# Patient Record
Sex: MALE | Race: WHITE | NOT HISPANIC OR LATINO | Employment: OTHER | URBAN - METROPOLITAN AREA
[De-identification: names, ages, dates, MRNs, and addresses within clinical notes are randomized per-mention and may not be internally consistent; named-entity substitution may affect disease eponyms.]

---

## 2017-05-23 ENCOUNTER — TRANSFERRED RECORDS (OUTPATIENT)
Dept: HEALTH INFORMATION MANAGEMENT | Facility: CLINIC | Age: 72
End: 2017-05-23

## 2018-03-20 ENCOUNTER — TRANSFERRED RECORDS (OUTPATIENT)
Dept: HEALTH INFORMATION MANAGEMENT | Facility: CLINIC | Age: 73
End: 2018-03-20

## 2019-03-13 ENCOUNTER — TRANSFERRED RECORDS (OUTPATIENT)
Dept: HEALTH INFORMATION MANAGEMENT | Facility: CLINIC | Age: 74
End: 2019-03-13

## 2019-04-04 ENCOUNTER — TRANSFERRED RECORDS (OUTPATIENT)
Dept: HEALTH INFORMATION MANAGEMENT | Facility: CLINIC | Age: 74
End: 2019-04-04

## 2019-04-04 LAB — TSH SERPL-ACNC: 1.3 UU/ML

## 2019-10-04 ENCOUNTER — HEALTH MAINTENANCE LETTER (OUTPATIENT)
Age: 74
End: 2019-10-04

## 2019-11-01 ENCOUNTER — HEALTH MAINTENANCE LETTER (OUTPATIENT)
Age: 74
End: 2019-11-01

## 2020-02-08 ENCOUNTER — HEALTH MAINTENANCE LETTER (OUTPATIENT)
Age: 75
End: 2020-02-08

## 2020-09-30 ENCOUNTER — TRANSFERRED RECORDS (OUTPATIENT)
Dept: HEALTH INFORMATION MANAGEMENT | Facility: CLINIC | Age: 75
End: 2020-09-30
Payer: COMMERCIAL

## 2020-10-01 ENCOUNTER — TRANSFERRED RECORDS (OUTPATIENT)
Dept: HEALTH INFORMATION MANAGEMENT | Facility: CLINIC | Age: 75
End: 2020-10-01
Payer: COMMERCIAL

## 2020-10-08 ENCOUNTER — TRANSFERRED RECORDS (OUTPATIENT)
Dept: HEALTH INFORMATION MANAGEMENT | Facility: CLINIC | Age: 75
End: 2020-10-08
Payer: COMMERCIAL

## 2020-11-08 ENCOUNTER — HEALTH MAINTENANCE LETTER (OUTPATIENT)
Age: 75
End: 2020-11-08

## 2021-03-28 ENCOUNTER — HEALTH MAINTENANCE LETTER (OUTPATIENT)
Age: 76
End: 2021-03-28

## 2021-09-11 ENCOUNTER — HEALTH MAINTENANCE LETTER (OUTPATIENT)
Age: 76
End: 2021-09-11

## 2021-10-08 ENCOUNTER — TRANSFERRED RECORDS (OUTPATIENT)
Dept: HEALTH INFORMATION MANAGEMENT | Facility: CLINIC | Age: 76
End: 2021-10-08
Payer: COMMERCIAL

## 2021-12-10 ASSESSMENT — ACTIVITIES OF DAILY LIVING (ADL): CURRENT_FUNCTION: NO ASSISTANCE NEEDED

## 2021-12-14 ENCOUNTER — OFFICE VISIT (OUTPATIENT)
Dept: FAMILY MEDICINE | Facility: CLINIC | Age: 76
End: 2021-12-14
Payer: COMMERCIAL

## 2021-12-14 VITALS
DIASTOLIC BLOOD PRESSURE: 70 MMHG | TEMPERATURE: 97.7 F | OXYGEN SATURATION: 98 % | RESPIRATION RATE: 16 BRPM | SYSTOLIC BLOOD PRESSURE: 142 MMHG | HEART RATE: 50 BPM | BODY MASS INDEX: 28.83 KG/M2 | HEIGHT: 67 IN | WEIGHT: 183.7 LBS

## 2021-12-14 DIAGNOSIS — Z87.19 HISTORY OF HEMORRHOIDS: ICD-10-CM

## 2021-12-14 DIAGNOSIS — N52.9 ERECTILE DYSFUNCTION, UNSPECIFIED ERECTILE DYSFUNCTION TYPE: ICD-10-CM

## 2021-12-14 DIAGNOSIS — I65.21 STENOSIS OF RIGHT CAROTID ARTERY: ICD-10-CM

## 2021-12-14 DIAGNOSIS — Z00.00 ENCOUNTER FOR MEDICARE ANNUAL WELLNESS EXAM: Primary | ICD-10-CM

## 2021-12-14 DIAGNOSIS — E78.5 HYPERLIPIDEMIA LDL GOAL <130: ICD-10-CM

## 2021-12-14 DIAGNOSIS — I10 ESSENTIAL HYPERTENSION: ICD-10-CM

## 2021-12-14 DIAGNOSIS — E03.9 HYPOTHYROIDISM, UNSPECIFIED TYPE: ICD-10-CM

## 2021-12-14 DIAGNOSIS — N13.8 HYPERTROPHY OF PROSTATE WITH URINARY OBSTRUCTION: ICD-10-CM

## 2021-12-14 DIAGNOSIS — K40.20 NON-RECURRENT BILATERAL INGUINAL HERNIA WITHOUT OBSTRUCTION OR GANGRENE: ICD-10-CM

## 2021-12-14 DIAGNOSIS — N40.1 HYPERTROPHY OF PROSTATE WITH URINARY OBSTRUCTION: ICD-10-CM

## 2021-12-14 PROBLEM — I77.74 VERTEBRAL ARTERY DISSECTION (H): Status: ACTIVE | Noted: 2021-12-14

## 2021-12-14 LAB
ALT SERPL W P-5'-P-CCNC: 25 U/L (ref 0–45)
ANION GAP SERPL CALCULATED.3IONS-SCNC: 12 MMOL/L (ref 5–18)
BUN SERPL-MCNC: 29 MG/DL (ref 8–28)
CALCIUM SERPL-MCNC: 9.1 MG/DL (ref 8.5–10.5)
CHLORIDE BLD-SCNC: 104 MMOL/L (ref 98–107)
CHOLEST SERPL-MCNC: 188 MG/DL
CO2 SERPL-SCNC: 23 MMOL/L (ref 22–31)
CREAT SERPL-MCNC: 1.16 MG/DL (ref 0.7–1.3)
FASTING STATUS PATIENT QL REPORTED: YES
GFR SERPL CREATININE-BSD FRML MDRD: 61 ML/MIN/1.73M2
GLUCOSE BLD-MCNC: 99 MG/DL (ref 70–125)
HDLC SERPL-MCNC: 68 MG/DL
LDLC SERPL CALC-MCNC: 105 MG/DL
POTASSIUM BLD-SCNC: 4.6 MMOL/L (ref 3.5–5)
SODIUM SERPL-SCNC: 139 MMOL/L (ref 136–145)
TRIGL SERPL-MCNC: 76 MG/DL
TSH SERPL DL<=0.005 MIU/L-ACNC: 5.79 UIU/ML (ref 0.3–5)

## 2021-12-14 PROCEDURE — 84460 ALANINE AMINO (ALT) (SGPT): CPT | Performed by: FAMILY MEDICINE

## 2021-12-14 PROCEDURE — 80048 BASIC METABOLIC PNL TOTAL CA: CPT | Performed by: FAMILY MEDICINE

## 2021-12-14 PROCEDURE — 84443 ASSAY THYROID STIM HORMONE: CPT | Performed by: FAMILY MEDICINE

## 2021-12-14 PROCEDURE — 90732 PPSV23 VACC 2 YRS+ SUBQ/IM: CPT | Performed by: FAMILY MEDICINE

## 2021-12-14 PROCEDURE — G0439 PPPS, SUBSEQ VISIT: HCPCS | Performed by: FAMILY MEDICINE

## 2021-12-14 PROCEDURE — 80061 LIPID PANEL: CPT | Performed by: FAMILY MEDICINE

## 2021-12-14 PROCEDURE — G0009 ADMIN PNEUMOCOCCAL VACCINE: HCPCS | Performed by: FAMILY MEDICINE

## 2021-12-14 PROCEDURE — 36415 COLL VENOUS BLD VENIPUNCTURE: CPT | Performed by: FAMILY MEDICINE

## 2021-12-14 PROCEDURE — 99214 OFFICE O/P EST MOD 30 MIN: CPT | Mod: 25 | Performed by: FAMILY MEDICINE

## 2021-12-14 RX ORDER — LEVOTHYROXINE SODIUM 175 UG/1
175 TABLET ORAL DAILY
Qty: 90 TABLET | Refills: 3 | Status: SHIPPED | OUTPATIENT
Start: 2021-12-14 | End: 2021-12-23

## 2021-12-14 RX ORDER — ASPIRIN 325 MG
325 TABLET ORAL DAILY
COMMUNITY
Start: 2021-12-14 | End: 2022-03-25

## 2021-12-14 RX ORDER — TADALAFIL 5 MG/1
10 TABLET ORAL DAILY PRN
Qty: 90 TABLET | Refills: 1 | Status: SHIPPED | OUTPATIENT
Start: 2021-12-14 | End: 2022-01-04

## 2021-12-14 RX ORDER — TERAZOSIN 5 MG/1
CAPSULE ORAL
COMMUNITY
Start: 2021-10-22 | End: 2022-05-04

## 2021-12-14 ASSESSMENT — ENCOUNTER SYMPTOMS
FEVER: 0
HEMATOCHEZIA: 0
ARTHRALGIAS: 0
NAUSEA: 0
CHILLS: 0
HEARTBURN: 0
WEAKNESS: 0
MYALGIAS: 0
ABDOMINAL PAIN: 0
SHORTNESS OF BREATH: 0
DIARRHEA: 0
DIZZINESS: 0
HEADACHES: 0
NERVOUS/ANXIOUS: 0
PALPITATIONS: 0
JOINT SWELLING: 0
SORE THROAT: 0
HEMATURIA: 0
PARESTHESIAS: 0
CONSTIPATION: 0
EYE PAIN: 0
FREQUENCY: 0
DYSURIA: 0
COUGH: 0

## 2021-12-14 ASSESSMENT — MIFFLIN-ST. JEOR: SCORE: 1521.89

## 2021-12-14 ASSESSMENT — ACTIVITIES OF DAILY LIVING (ADL): CURRENT_FUNCTION: NO ASSISTANCE NEEDED

## 2021-12-14 NOTE — ASSESSMENT & PLAN NOTE
Palpable defect in the inguinal crease.  Likely bilateral hernia.  No pain or discomfort.  Longstanding without change.  Consider surgical referral if he develops discomfort.  We discussed the potential for incarceration.

## 2021-12-14 NOTE — PATIENT INSTRUCTIONS
Patient Education   Personalized Prevention Plan  You are due for the preventive services outlined below.  Your care team is available to assist you in scheduling these services.  If you have already completed any of these items, please share that information with your care team to update in your medical record.  Health Maintenance Due   Topic Date Due     ANNUAL REVIEW OF HM ORDERS  Never done     Zoster (Shingles) Vaccine (1 of 2) Never done     Pneumococcal Vaccine (1 of 1 - PPSV23) Never done     Thyroid Function Lab  10/07/2014     FALL RISK ASSESSMENT  10/14/2015     Cholesterol Lab  10/01/2019     Discuss Advance Care Planning  10/14/2019     Flu Vaccine (1) Never done

## 2021-12-14 NOTE — ASSESSMENT & PLAN NOTE
Trial of tadalafil.  Discussed potential lightheadedness from the combination of Terazosin and tadalafil.  Both medications will be dosed low.
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
## 2021-12-14 NOTE — ASSESSMENT & PLAN NOTE
Annual exam.  We spent time reviewing the EHR and updating information.  Records have been requested from his previous clinic.  Up-to-date with COVID-19 vaccination.  Fasting lab work today.  Follow-up in 6-12 months.
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
## 2021-12-14 NOTE — ASSESSMENT & PLAN NOTE
Blood pressure reasonably well controlled today.  Most recent measurements at home clinic were reportedly within normal limits.  No change to plan.  We will check basic metabolic panel.  We will plan to assume prescribing for his lisinopril/hydrochlorothiazide combination pill.

## 2021-12-20 DIAGNOSIS — E03.9 HYPOTHYROIDISM, UNSPECIFIED TYPE: ICD-10-CM

## 2021-12-21 ENCOUNTER — MYC MEDICAL ADVICE (OUTPATIENT)
Dept: FAMILY MEDICINE | Facility: CLINIC | Age: 76
End: 2021-12-21
Payer: COMMERCIAL

## 2021-12-21 DIAGNOSIS — E03.9 HYPOTHYROIDISM, UNSPECIFIED TYPE: ICD-10-CM

## 2021-12-23 RX ORDER — LEVOTHYROXINE SODIUM 175 UG/1
175 TABLET ORAL DAILY
Qty: 90 TABLET | Refills: 3 | Status: SHIPPED | OUTPATIENT
Start: 2021-12-23 | End: 2022-12-20

## 2022-01-03 DIAGNOSIS — N52.9 ERECTILE DYSFUNCTION, UNSPECIFIED ERECTILE DYSFUNCTION TYPE: ICD-10-CM

## 2022-01-03 NOTE — TELEPHONE ENCOUNTER
Medication Request  Medication name: Tadalafil 5 mg  Requested Pharmacy: [id number]  When was patient last seen for this?:  12/14/2021  Patient offered appointment:  N/A pharmacy request  Okay to leave a detailed message: no

## 2022-01-04 RX ORDER — TADALAFIL 5 MG/1
10 TABLET ORAL DAILY PRN
Qty: 90 TABLET | Refills: 1 | Status: SHIPPED | OUTPATIENT
Start: 2022-01-04 | End: 2024-01-24

## 2022-01-10 ENCOUNTER — MYC MEDICAL ADVICE (OUTPATIENT)
Dept: FAMILY MEDICINE | Facility: CLINIC | Age: 77
End: 2022-01-10
Payer: COMMERCIAL

## 2022-01-10 DIAGNOSIS — I10 HYPERTENSION GOAL BP (BLOOD PRESSURE) < 140/90: ICD-10-CM

## 2022-01-10 RX ORDER — LISINOPRIL AND HYDROCHLOROTHIAZIDE 20; 25 MG/1; MG/1
1 TABLET ORAL DAILY
Qty: 90 TABLET | Refills: 3 | Status: SHIPPED | OUTPATIENT
Start: 2022-01-10 | End: 2022-01-19

## 2022-01-11 ENCOUNTER — TELEPHONE (OUTPATIENT)
Dept: FAMILY MEDICINE | Facility: CLINIC | Age: 77
End: 2022-01-11
Payer: COMMERCIAL

## 2022-01-11 NOTE — TELEPHONE ENCOUNTER
Prior Authorization Request  Who s requesting:  Pharmacy  Pharmacy Name and Location: [facility]  Medication Name: Tadalafil 5 mg  Insurance Plan: [employer]  Insurance Member ID Number:  [id number]  CoverMyMeds Key:   Informed patient that prior authorizations can take up to 10 business days for response:   NA  Okay to leave a detailed message: No     Route to pool:  S[facility]NCHEZ CHOI [facility]

## 2022-01-13 NOTE — TELEPHONE ENCOUNTER
Central Prior Authorization Team   Phone: [phone]    PA Initiation    Medication: Tadalafil 5MG tablets  Insurance Company: [employer] - Phone [phone] Fax [phone]  Pharmacy Filling the Rx: [id number] - [place], [place] - [address]  Filling Pharmacy Phone: [phone]  Filling Pharmacy Fax:    Start Date: 1/13/2022

## 2022-01-17 NOTE — TELEPHONE ENCOUNTER
PRIOR AUTHORIZATION DENIED    Medication: Tadalafil 5MG tablets    Denial Date: 1/17/2022    Denial Rational:          Appeal Information:  If provider would like to appeal please provide a letter of medical necessity and route back to PA team.

## 2022-01-18 NOTE — TELEPHONE ENCOUNTER
Left detailed message for patient to confirm Lisinopril/HCTZ dose 20/25 or 20/12.5 ?  Which dose has patient been taking?

## 2022-01-19 RX ORDER — LISINOPRIL AND HYDROCHLOROTHIAZIDE 12.5; 2 MG/1; MG/1
1 TABLET ORAL DAILY
Qty: 90 TABLET | Refills: 3 | Status: SHIPPED | OUTPATIENT
Start: 2022-01-19 | End: 2022-12-20

## 2022-03-25 ENCOUNTER — MYC MEDICAL ADVICE (OUTPATIENT)
Dept: FAMILY MEDICINE | Facility: CLINIC | Age: 77
End: 2022-03-25
Payer: COMMERCIAL

## 2022-03-25 DIAGNOSIS — I65.21 STENOSIS OF RIGHT CAROTID ARTERY: ICD-10-CM

## 2022-03-25 DIAGNOSIS — B00.9 HSV (HERPES SIMPLEX VIRUS) INFECTION: Primary | ICD-10-CM

## 2022-03-25 RX ORDER — ACYCLOVIR 50 MG/G
CREAM TOPICAL
Qty: 15 G | Refills: 3 | Status: SHIPPED | OUTPATIENT
Start: 2022-03-25

## 2022-03-25 RX ORDER — ASPIRIN 325 MG
325 TABLET ORAL DAILY
Qty: 90 TABLET | Refills: 3 | Status: SHIPPED | OUTPATIENT
Start: 2022-03-25 | End: 2022-12-20

## 2022-04-06 ENCOUNTER — TELEPHONE (OUTPATIENT)
Dept: FAMILY MEDICINE | Facility: CLINIC | Age: 77
End: 2022-04-06
Payer: COMMERCIAL

## 2022-04-06 NOTE — TELEPHONE ENCOUNTER
Reason for Call:  Patient is calling to request the RX for  acyclovir (ZOVIRAX) 5 % external cream     BE CHANGED TO OINTMENT, NOT CREAM    SEND TO PETE [facility]ITO,     Detailed comments: LAST SEEN 12/2021. RX SENT ON 3/25, would like it changed to OINTMENT for better coverage by insurance.    Phone Number Patient can be reached at: Home number on file [phone] (home)    Best Time: any    Can we leave a detailed message on this number? YES    Call taken on 4/6/2022 at 2:49 PM by Andree Baeza

## 2022-05-04 DIAGNOSIS — E78.5 HYPERLIPIDEMIA LDL GOAL <130: ICD-10-CM

## 2022-05-04 DIAGNOSIS — N40.1 HYPERTROPHY OF PROSTATE WITH URINARY OBSTRUCTION: Primary | ICD-10-CM

## 2022-05-04 DIAGNOSIS — N13.8 HYPERTROPHY OF PROSTATE WITH URINARY OBSTRUCTION: Primary | ICD-10-CM

## 2022-05-04 RX ORDER — LOVASTATIN 40 MG
40 TABLET ORAL AT BEDTIME
Qty: 90 TABLET | Refills: 3 | Status: SHIPPED | OUTPATIENT
Start: 2022-05-04 | End: 2022-12-20

## 2022-05-04 RX ORDER — TERAZOSIN 5 MG/1
5 CAPSULE ORAL AT BEDTIME
Qty: 90 CAPSULE | Refills: 2 | Status: SHIPPED | OUTPATIENT
Start: 2022-05-04 | End: 2023-01-12

## 2022-05-04 NOTE — TELEPHONE ENCOUNTER
Reason for Call: patient is calling for refills of    lovastatin (MEVACOR) 40 MG tablet     terazosin (HYTRIN) 5 MG capsule     SEND TO [facility]    Detailed comments: last seen 12/2021.    Phone Number Patient can be reached at: Home number on file [phone] (home)    Best Time: any    Can we leave a detailed message on this number? YES    Call taken on 5/4/2022 at 8:27 AM by Andree Baeza

## 2022-05-17 ENCOUNTER — VIRTUAL VISIT (OUTPATIENT)
Dept: URGENT CARE | Facility: CLINIC | Age: 77
End: 2022-05-17
Payer: COMMERCIAL

## 2022-05-17 ENCOUNTER — NURSE TRIAGE (OUTPATIENT)
Dept: NURSING | Facility: CLINIC | Age: 77
End: 2022-05-17

## 2022-05-17 ENCOUNTER — NURSE TRIAGE (OUTPATIENT)
Dept: NURSING | Facility: CLINIC | Age: 77
End: 2022-05-17
Payer: COMMERCIAL

## 2022-05-17 DIAGNOSIS — U07.1 COVID: Primary | ICD-10-CM

## 2022-05-17 PROCEDURE — 99207 PR NO BILLABLE SERVICE THIS VISIT: CPT | Performed by: EMERGENCY MEDICINE

## 2022-05-17 NOTE — TELEPHONE ENCOUNTER
Patient spoke with FNA this morning and was advised to schedule a virtual visit with a provider to discuss Covid treatment options. Patient states he did not receive any call despite message in [employer] from provider that attempts were made to reach him.     Patient calling back now to reschedule the appointment. Spoke with  who is able to reschedule a virtual visit for tomorrow.    Ifrah Alvarenga RN  05/17/22 2:05 PM  [facility] Nurse Advisor      Additional Information    [1] Follow-up call to recent contact AND [2] information only call, no triage required    Protocols used: INFORMATION ONLY CALL-A-

## 2022-05-17 NOTE — PROGRESS NOTES
Attempted to reach patient multiple times over 20 to 25-minute..  Left several messages but unable to connect with patient.  Final attempt found mailbox to be full.    RISHI Toure MD

## 2022-05-17 NOTE — TELEPHONE ENCOUNTER
Triage Call:     Cough, headache started Sunday, 5/15/2022  Tested positive yesterday, 5/16/2022 for COVID-19    97% on RA  97.9F  BP: 116/60  RR:18  Pulse: 53    Patient is taking:   Tylenol   Cough drops    Disposition: Virtual appt. Pt agreed with plan to do a virtual appt with a provider for consideration for Paxlovid. Pt was given the care advice per the protocol as well.     How can I protect others?    These guidelines are for isolating before returning to work, school or .       If you DO have symptoms:  o Stay home and away from others  - For at least 5 days after your symptoms started, AND   - You are fever free for 24 hours (with no medicine that reduces fever), AND  - Your other symptoms are better.  o Wear a mask for 10 full days any time you are around others.    If you DON'T have symptoms:  o Stay at home and away from others for at least 5 days after your positive test.  o Wear a mask for 10 full days any time you are around others.      Reason for Disposition    HIGH RISK for severe COVID complications (e.g., weak immune system, age > 64 years, obesity with BMI > 25, pregnant, chronic lung disease or other chronic medical condition) (Exception: Already seen by PCP and no new or worsening symptoms.)    Additional Information    Negative: SEVERE difficulty breathing (e.g., struggling for each breath, speaks in single words)    Negative: Difficult to awaken or acting confused (e.g., disoriented, slurred speech)    Negative: Bluish (or gray) lips or face now    Negative: Shock suspected (e.g., cold/pale/clammy skin, too weak to stand, low BP, rapid pulse)    Negative: Sounds like a life-threatening emergency to the triager    Negative: [1] Diagnosed or suspected COVID-19 AND [2] symptoms lasting 3 or more weeks    Negative: [1] COVID-19 exposure AND [2] no symptoms    Negative: COVID-19 vaccine reaction suspected (e.g., fever, headache, muscle aches) occurring 1 to 3 days after getting vaccine     Negative: COVID-19 vaccine, questions about    Negative: [1] Lives with someone known to have influenza (flu test positive) AND [2] flu-like symptoms (e.g., cough, runny nose, sore throat, SOB; with or without fever)    Negative: [1] Adult with possible COVID-19 symptoms AND [2] triager concerned about severity of symptoms or other causes    Negative: COVID-19 and breastfeeding, questions about    Negative: SEVERE or constant chest pain or pressure  (Exception: Mild central chest pain, present only when coughing.)    Negative: MODERATE difficulty breathing (e.g., speaks in phrases, SOB even at rest, pulse 100-120)    Negative: Headache and stiff neck (can't touch chin to chest)    Negative: Oxygen level (e.g., pulse oximetry) 90 percent or lower    Negative: Chest pain or pressure    Negative: Patient sounds very sick or weak to the triager    Negative: MILD difficulty breathing (e.g., minimal/no SOB at rest, SOB with walking, pulse <100)    Negative: Fever > 103 F (39.4 C)    Negative: [1] Fever > 101 F (38.3 C) AND [2] over 60 years of age    Negative: [1] Fever > 100.0 F (37.8 C) AND [2] bedridden (e.g., nursing home patient, CVA, chronic illness, recovering from surgery)    Protocols used: CORONAVIRUS (COVID-19) DIAGNOSED OR [id number] 1.18.2022    Karmen Faust RN  [facility] Nurse Advisor 10:58 AM 5/17/2022

## 2022-05-17 NOTE — PROGRESS NOTES
Taz is a 77 year old who is being evaluated via a billable telephone visit.      What phone number would you like to be contacted at? [phone]  How would you like to obtain your AVS? MyChart    Assessment & Plan       ICD-10-CM    1. Infection due to 2019 novel coronavirus  U07.1 nirmatrelvir and ritonavir (PAXLOVID) therapy pack     Patient prescribed PAXLOVID. It has been >12 hours since his last dose of lovastatin and terazosin. Patient instructed to hold these medications, along with Cialis, during PAXLOVID treatment. Ok to resume 12 hours after his final dose of PAXLOVID. Continue supportive cares. Get plenty of rest and fluids.    Return in about 1 week (around 5/25/2022), or if symptoms worsen or fail to improve.    NORMA Rivera [facility]   Taz is a 77 year old who presents for the following health issues     HPI       COVID-19 Symptom Review  How many days ago did these symptoms start? 5/15/2022, lethargic. Tested positive on 5/16    Are any of the following symptoms significant for you?    New or worsening difficulty breathing? No    Worsening cough? No    Fever or chills? No    Headache: no    Sore throat: no    Chest pain: no    Diarrhea: no    Body aches? no    What treatments has patient tried? Tyl   Does patient live in a nursing home, group home, or shelter? no  Does patient have a way to get food/medications during quarantined? Yes, I have a friend or family member who can help me.      Review of Systems   Constitutional, HEENT, cardiovascular, pulmonary, GI, , musculoskeletal, neuro, skin, endocrine and psych systems are negative, except as otherwise noted.      Objective           Vitals:  No vitals were obtained today due to virtual visit.    Physical Exam   alert and no distress  PSYCH: Alert and oriented times 3; coherent speech, normal   rate and volume, able to articulate logical thoughts, able   to abstract reason, no  tangential thoughts, no hallucinations   or delusions  His affect is normal  RESP: No cough, no audible wheezing, able to talk in full sentences  Remainder of exam unable to be completed due to telephone visits                Phone call duration: 9 minutes

## 2022-05-18 ENCOUNTER — VIRTUAL VISIT (OUTPATIENT)
Dept: FAMILY MEDICINE | Facility: CLINIC | Age: 77
End: 2022-05-18
Payer: COMMERCIAL

## 2022-05-18 DIAGNOSIS — U07.1 INFECTION DUE TO 2019 NOVEL CORONAVIRUS: Primary | ICD-10-CM

## 2022-05-18 PROCEDURE — 99213 OFFICE O/P EST LOW 20 MIN: CPT | Mod: TEL | Performed by: PHYSICIAN ASSISTANT

## 2022-05-18 NOTE — PATIENT INSTRUCTIONS
"While on PAXLOVID, do not take lovastatin, terazosin, or tadalafil as these can interact with the medication. You are ok to resume these medications 12 hours AFTER your final dose of PAXLOVID.           Instructions for Patients  Your COVID-19 test was positive. This means you have the virus. Please follow the \"How can I take care of myself\" and \"How do I self-isolate?\" guidelines in these instructions.    What treatments are available?  Over-the-counter medicines may help with your symptoms such as runny or stuffy nose, cough, chills, and fever. Talk to your care team about your options.     Some people are at high risk for severe illness (for example if you have a weak immune system, you're 65 or older, or you have certain medical problems). If your risk it high and your symptoms started in the last 5 to 7 days, we strongly recommend for you to get COVID treatment as soon as possible before you get really sick. Paxlovid, Molnupiravir and the monoclonal antibody treatments are proven safe and effective, make you feel better faster, and prevent hospitalization and death.       You can schedule an appointment to discuss COVID treatment by requesting an appointment on [facility] by selecting \"schedule COVID-19 Treatment\" or by calling [email or web address] ([phone]) and pressing 7.    What are the symptoms of COVID-19?  Symptoms can include fever, cough, shortness of breath, chills, headache, muscle pain sore throat, fatigue, runny or stuffy nose, and loss of taste and smell. Other less common symptoms include nausea, vomiting, or diarrhea (watery stools).    Know when to call 911. Emergency warning signs include:  Trouble breathing or shortness of breath  Pain or pressure in the chest that doesn't go away  Feeling confused like you haven't felt before, or not being able to wake up  Bluish-colored lips or face    How can I take care of myself?  Get lots of rest. Drink extra fluids (unless a doctor has told you not " to).  Take Tylenol (acetaminophen) for fever or pain. If you have liver or kidney problems, ask your family doctor if it's okay to take Tylenol   Adults:   650 mg (two 325 mg pills or tablets) every 4 to 6 hours, or...   1,000 mg (two 500 mg pills or tablets) every 8 hours as needed.  Note: Don't take more than 3,000 mg in one day. Acetaminophen is found in many medicines (both prescribed and over the counter). Read all labels to be sure you don't take too much.  For children, check the Tylenol bottle for the right dose. The dose is based on the child's age or weight.  Take over the counter medicines for your symptoms as needed. Talk to your pharmacist.  If you have other health problems (like cancer, heart failure, an organ transplant, or severe kidney disease): Call your specialty clinic if you don't feel better in the next 2 days.    These guidelines are for isolating and quarantining before returning to work, school or .   For employers, schools and day cares: This is an official notice for this person s medical guidelines for returning in-person.   For health care sites: The CDC gives different isolation and quarantine guidelines for healthcare sites, please check with these sites before arriving.     How do I self-isolate?  You isolate when you have symptoms of COVID or a test shows you have COVID, even if you don t have symptoms.   If you DO have symptoms:  Stay home and away from others  For at least 5 days after your symptoms started, AND   You are fever free for 24 hours (with no medicine that reduces fever), AND  Your other symptoms are better.  Wear a mask for 10 full days any time you are around others.  If you DON T have symptoms:  Stay at home and away from others for at least 5 days after your positive test.  Wear a mask for 10 full days any time you are around others.    How and when do I quarantine?  You quarantine when you may have been exposed to the virus and DON T have symptoms.   Stay  home and away from others.   You must quarantine for 5 days after your last contact with a person who has COVID.  Note: If you are fully vaccinated, you don t need to quarantine. You should still follow the steps below.   Wear a mask for 10 full days any time you re around others.  Get tested at least 5 days after you were exposed, even if you don t have symptoms.   If you start to have symptoms, isolate right away and get tested.    Where can I get more information?  [facility] COVID-19 Resource Hub: [email or web address]   CDC Quarantine & Isolation: https://www.cdc.gov/coronavirus/2019-ncov/your-health/quarantine-isolation.html   CDC - What to Do If You're Sick: https://www.cdc.gov/coronavirus/2019-ncov/if-you-are-sick/index.html  [facility] clinical trials (COVID-19 research studies): [email or web address].[email or web address]/umn-clinical-trials  [place] Department of Health COVID-19 Public Hotline: 1-365.254.6989

## 2022-10-30 ENCOUNTER — HEALTH MAINTENANCE LETTER (OUTPATIENT)
Age: 77
End: 2022-10-30

## 2022-12-19 ASSESSMENT — ENCOUNTER SYMPTOMS
FREQUENCY: 1
WEAKNESS: 0
CHILLS: 0
CONSTIPATION: 0
HEMATOCHEZIA: 0
PALPITATIONS: 0
FEVER: 0
NAUSEA: 0
COUGH: 0
ARTHRALGIAS: 1
DIARRHEA: 0
HEADACHES: 0
MYALGIAS: 0
EYE PAIN: 0
SORE THROAT: 0
SHORTNESS OF BREATH: 0
HEARTBURN: 0
DIZZINESS: 0
PARESTHESIAS: 0
ABDOMINAL PAIN: 0
HEMATURIA: 0
JOINT SWELLING: 0
NERVOUS/ANXIOUS: 0
DYSURIA: 0

## 2022-12-19 ASSESSMENT — ACTIVITIES OF DAILY LIVING (ADL): CURRENT_FUNCTION: NO ASSISTANCE NEEDED

## 2022-12-20 ENCOUNTER — OFFICE VISIT (OUTPATIENT)
Dept: FAMILY MEDICINE | Facility: CLINIC | Age: 77
End: 2022-12-20
Payer: COMMERCIAL

## 2022-12-20 ENCOUNTER — MYC MEDICAL ADVICE (OUTPATIENT)
Dept: FAMILY MEDICINE | Facility: CLINIC | Age: 77
End: 2022-12-20

## 2022-12-20 VITALS
WEIGHT: 177.5 LBS | TEMPERATURE: 97.6 F | BODY MASS INDEX: 27.86 KG/M2 | DIASTOLIC BLOOD PRESSURE: 78 MMHG | RESPIRATION RATE: 16 BRPM | OXYGEN SATURATION: 98 % | SYSTOLIC BLOOD PRESSURE: 182 MMHG | HEIGHT: 67 IN | HEART RATE: 49 BPM

## 2022-12-20 DIAGNOSIS — N13.8 HYPERTROPHY OF PROSTATE WITH URINARY OBSTRUCTION: ICD-10-CM

## 2022-12-20 DIAGNOSIS — M25.511 CHRONIC RIGHT SHOULDER PAIN: ICD-10-CM

## 2022-12-20 DIAGNOSIS — N40.1 HYPERTROPHY OF PROSTATE WITH URINARY OBSTRUCTION: ICD-10-CM

## 2022-12-20 DIAGNOSIS — I10 ESSENTIAL HYPERTENSION: ICD-10-CM

## 2022-12-20 DIAGNOSIS — M19.011 PRIMARY OSTEOARTHRITIS OF RIGHT SHOULDER: ICD-10-CM

## 2022-12-20 DIAGNOSIS — G89.29 CHRONIC RIGHT SHOULDER PAIN: ICD-10-CM

## 2022-12-20 DIAGNOSIS — R39.9 LOWER URINARY TRACT SYMPTOMS (LUTS): ICD-10-CM

## 2022-12-20 DIAGNOSIS — Z00.00 ENCOUNTER FOR MEDICARE ANNUAL WELLNESS EXAM: Primary | ICD-10-CM

## 2022-12-20 DIAGNOSIS — I65.21 STENOSIS OF RIGHT CAROTID ARTERY: ICD-10-CM

## 2022-12-20 DIAGNOSIS — Z12.5 SCREENING FOR PROSTATE CANCER: ICD-10-CM

## 2022-12-20 DIAGNOSIS — E78.5 HYPERLIPIDEMIA LDL GOAL <130: ICD-10-CM

## 2022-12-20 DIAGNOSIS — E03.9 HYPOTHYROIDISM, UNSPECIFIED TYPE: ICD-10-CM

## 2022-12-20 PROBLEM — K40.20 NON-RECURRENT BILATERAL INGUINAL HERNIA WITHOUT OBSTRUCTION OR GANGRENE: Status: RESOLVED | Noted: 2021-12-14 | Resolved: 2022-12-20

## 2022-12-20 PROCEDURE — 80048 BASIC METABOLIC PNL TOTAL CA: CPT | Performed by: FAMILY MEDICINE

## 2022-12-20 PROCEDURE — G0439 PPPS, SUBSEQ VISIT: HCPCS | Performed by: FAMILY MEDICINE

## 2022-12-20 PROCEDURE — 36415 COLL VENOUS BLD VENIPUNCTURE: CPT | Performed by: FAMILY MEDICINE

## 2022-12-20 PROCEDURE — 99214 OFFICE O/P EST MOD 30 MIN: CPT | Mod: 25 | Performed by: FAMILY MEDICINE

## 2022-12-20 PROCEDURE — G0009 ADMIN PNEUMOCOCCAL VACCINE: HCPCS | Performed by: FAMILY MEDICINE

## 2022-12-20 PROCEDURE — 84443 ASSAY THYROID STIM HORMONE: CPT | Performed by: FAMILY MEDICINE

## 2022-12-20 PROCEDURE — 80061 LIPID PANEL: CPT | Performed by: FAMILY MEDICINE

## 2022-12-20 PROCEDURE — G0103 PSA SCREENING: HCPCS | Performed by: FAMILY MEDICINE

## 2022-12-20 PROCEDURE — 90670 PCV13 VACCINE IM: CPT | Performed by: FAMILY MEDICINE

## 2022-12-20 RX ORDER — LOVASTATIN 40 MG
40 TABLET ORAL AT BEDTIME
Qty: 90 TABLET | Refills: 3 | Status: SHIPPED | OUTPATIENT
Start: 2022-12-20 | End: 2024-01-15

## 2022-12-20 RX ORDER — LEVOTHYROXINE SODIUM 175 UG/1
175 TABLET ORAL DAILY
Qty: 90 TABLET | Refills: 3 | Status: SHIPPED | OUTPATIENT
Start: 2022-12-20 | End: 2024-01-18

## 2022-12-20 RX ORDER — LISINOPRIL AND HYDROCHLOROTHIAZIDE 12.5; 2 MG/1; MG/1
1 TABLET ORAL DAILY
Qty: 90 TABLET | Refills: 3 | Status: SHIPPED | OUTPATIENT
Start: 2022-12-20 | End: 2023-10-16

## 2022-12-20 RX ORDER — ASPIRIN 325 MG
325 TABLET ORAL DAILY
Qty: 90 TABLET | Refills: 3 | Status: SHIPPED | OUTPATIENT
Start: 2022-12-20

## 2022-12-20 ASSESSMENT — ENCOUNTER SYMPTOMS
NAUSEA: 0
DYSURIA: 0
CONSTIPATION: 0
WEAKNESS: 0
HEMATURIA: 0
HEADACHES: 0
HEARTBURN: 0
EYE PAIN: 0
MYALGIAS: 0
ABDOMINAL PAIN: 0
DIZZINESS: 0
NERVOUS/ANXIOUS: 0
JOINT SWELLING: 0
ARTHRALGIAS: 1
DIARRHEA: 0
FREQUENCY: 1
FEVER: 0
PALPITATIONS: 0
CHILLS: 0
SHORTNESS OF BREATH: 0
SORE THROAT: 0
PARESTHESIAS: 0
HEMATOCHEZIA: 0
COUGH: 0

## 2022-12-20 ASSESSMENT — ACTIVITIES OF DAILY LIVING (ADL): CURRENT_FUNCTION: NO ASSISTANCE NEEDED

## 2022-12-20 NOTE — ASSESSMENT & PLAN NOTE
The patient is looking for follow-up on this.  Previous measurements have shown less than 50% stenosis.  No symptoms suggestive of presyncope/syncope related to carotid artery disease.  Continue statin.  Continue aspirin.  Evaluate with ultrasound.

## 2022-12-20 NOTE — PROGRESS NOTES
"SUBJECTIVE:   Taz is a 77 year old who presents for Preventive Visit.    Chief Complaint   Patient presents with     Wellness Visit     Fasting     Right shoulder pain:   - onset with pickle ball.   - took a break.   - some limitation of movement  \"mild soreness.\"     LUTS:   - drinks coffee throughout the day.  Worse with alcohol.  More nocturia.  Sense of incomplete voiding.  Decreased urinary flow.  Takes terazosin.  No obvious side effects from terazosin.    Patient has been advised of split billing requirements and indicates understanding: Yes  Are you in the first 12 months of your Medicare coverage?  No    Healthy Habits:     In general, how would you rate your overall health?  Excellent    Frequency of exercise:  6-7 days/week    Duration of exercise:  Greater than 60 minutes    Do you usually eat at least 4 servings of fruit and vegetables a day, include whole grains    & fiber and avoid regularly eating high fat or \"junk\" foods?  No    Taking medications regularly:  Yes    Medication side effects:  Not applicable    Ability to successfully perform activities of daily living:  No assistance needed    Home Safety:  Lack of grab bars in the bathroom    Hearing Impairment:  Difficulty following a conversation in a noisy restaurant or crowded room and difficulty understanding soft or whispered speech    In the past 6 months, have you been bothered by leaking of urine?  No    In general, how would you rate your overall mental or emotional health?  Excellent      PHQ-2 Total Score: 0    Additional concerns today:  Yes      Have you ever done Advance Care Planning? (For example, a Health Directive, POLST, or a discussion with a medical provider or your loved ones about your wishes): Yes, patient states has an Advance Care Planning document and will bring a copy to the clinic.       Fall risk  Fallen 2 or more times in the past year?: No  Any fall with injury in the past year?: No    Cognitive Screening   1) " Repeat 3 items (Leader, Season, Table) Normal  2) Clock draw: NORMAL  3) 3 item recall: Recalls 2 objects   Results: NORMAL clock, 1-2 items recalled: COGNITIVE IMPAIRMENT LESS LIKELY    Mini-CogTM Copyright S Ariane. Licensed by the author for use in [facility]; reprinted with permission ([email or web address]). All rights reserved.          Reviewed and updated as needed this visit by clinical staff   Tobacco  Allergies  Meds  Problems             Reviewed and updated as needed this visit by Provider      Problems            Social History     Tobacco Use     Smoking status: Former     Packs/day: 0.00     Years: 10.00     Pack years: 0.00     Types: Cigarettes     Start date: 1958     Quit date: 1968     Years since quittin.6     Smokeless tobacco: Never   Substance Use Topics     Alcohol use: Yes     Comment: Casual     If you drink alcohol do you typically have >3 drinks per day or >7 drinks per week? No    Alcohol Use 2022   Prescreen: >3 drinks/day or >7 drinks/week? -   Prescreen: >3 drinks/day or >7 drinks/week? No       Current providers sharing in care for this patient include:   Patient Care Team:  Tre Mckeon MD as PCP - General (Family Medicine)  Tre Mckeon MD as Assigned PCP    The following health maintenance items are reviewed in Epic and correct as of today:  Health Maintenance   Topic Date Due     COVID-19 Vaccine (4 - Booster for Moderna series) 2021     TSH W/FREE T4 REFLEX  2022     ZOSTER IMMUNIZATION (1 of 2) 2023 (Originally 1995)     INFLUENZA VACCINE (1) 2023 (Originally 2022)     MEDICARE ANNUAL WELLNESS VISIT  2023     ANNUAL REVIEW OF HM ORDERS  2023     FALL RISK ASSESSMENT  2023     LIPID  [id number]     ADVANCE CARE PLANNING  2027     DTAP/TDAP/TD IMMUNIZATION (2 - Td or Tdap) 2028     HEPATITIS C SCREENING  Completed     PHQ-2 (once per calendar year)  Completed     Pneumococcal  "Vaccine: 65+ Years  Completed     IPV IMMUNIZATION  Aged Out     MENINGITIS IMMUNIZATION  Aged Out     COLORECTAL CANCER SCREENING  Discontinued     Pneumonia Vaccine:For adults 65 years or older who do not have an immunocompromising condition, cerebrospinal fluid leak, or cochlear implant and want to receive PCV13 AND PPSV23: Administer 1 dose of PCV13 first then give 1 dose of PPSV23 at least 1 year later. If the patient already received PPSV23, give the dose of PCV13 at least 1 year after they received the most recent dose of PPSV23. Anyone who received any doses of PPSV23 before age 65 should receive 1 final dose of the vaccine at age 65 or older. Administer this last dose at least 5 years after the prior PPSV23 dose.        Review of Systems   Constitutional: Negative for chills and fever.   HENT: Positive for hearing loss. Negative for congestion, ear pain and sore throat.    Eyes: Negative for pain and visual disturbance.   Respiratory: Negative for cough and shortness of breath.    Cardiovascular: Negative for chest pain, palpitations and peripheral edema.   Gastrointestinal: Negative for abdominal pain, constipation, diarrhea, heartburn, hematochezia and nausea.   Genitourinary: Positive for frequency, impotence and urgency. Negative for dysuria, genital sores, hematuria and penile discharge.   Musculoskeletal: Positive for arthralgias. Negative for joint swelling and myalgias.   Skin: Negative for rash.   Neurological: Negative for dizziness, weakness, headaches and paresthesias.   Psychiatric/Behavioral: Negative for mood changes. The patient is not nervous/anxious.        OBJECTIVE:   BP (!) 182/78 (BP Location: Left arm, Patient Position: Sitting, Cuff Size: Adult Regular)   Pulse (!) 49   Temp 97.6  F (36.4  C) (Oral)   Resp 16   Ht 1.702 m (5' 7\")   Wt 80.5 kg (177 lb 8 oz)   SpO2 98%   BMI 27.80 kg/m   Estimated body mass index is 27.8 kg/m  as calculated from the following:    Height as of " "this encounter: 1.702 m (5' 7\").    Weight as of this encounter: 80.5 kg (177 lb 8 oz).  Physical Exam  Vitals reviewed.   Constitutional:       General: He is not in acute distress.     Appearance: Normal appearance.   HENT:      Head: Normocephalic and atraumatic.      Right Ear: External ear normal.      Left Ear: External ear normal.      Nose: Nose normal.      Mouth/Throat:      Pharynx: No oropharyngeal exudate or posterior oropharyngeal erythema.   Eyes:      General: No scleral icterus.  Cardiovascular:      Rate and Rhythm: Normal rate and regular rhythm.      Heart sounds: Normal heart sounds. No murmur heard.    No friction rub. No gallop.   Pulmonary:      Effort: Pulmonary effort is normal. No respiratory distress.      Breath sounds: No wheezing.   Abdominal:      General: Bowel sounds are normal. There is no distension.      Palpations: Abdomen is soft. There is no mass.      Tenderness: There is no abdominal tenderness.   Musculoskeletal:         General: No swelling. Normal range of motion.      Cervical back: Normal range of motion.      Comments: Negative Neer's.  Mildly positive Richter.  Active range of motion is intact and is normal.  No pain with internal or external resisted range of motion.  Negative empty can testing.  Nontender to palpation.  Bulk/symmetry intact.   Skin:     Findings: No rash.   Neurological:      General: No focal deficit present.      Mental Status: He is alert and oriented to person, place, and time.      Cranial Nerves: No cranial nerve deficit.      Deep Tendon Reflexes: Reflexes normal.   Psychiatric:         Mood and Affect: Mood normal.         Behavior: Behavior normal.         Thought Content: Thought content normal.         Judgment: Judgment normal.         Diagnostic Test Results:  Labs reviewed in Epic    ASSESSMENT / PLAN:     Problem List Items Addressed This Visit     RESOLVED: Chronic right shoulder pain    Relevant Orders    Physical Therapy Referral "    DJD of shoulder     The patient has had some ongoing pain of the right shoulder.  History of what sounds like osteoarthritis which was amenable to steroid injection.  Imaging not completed today.  Physical therapy suggested although the patient is undecided.         Relevant Medications    aspirin (ASA) 325 MG tablet    Encounter for Medicare annual wellness exam - Primary    Essential hypertension     This patient's blood pressure is unexpectedly elevated today.  It is checked from time to time outside of clinic and is generally been within normal limits.  I have asked him to check it every day for the next couple of weeks and report back.  If it remains elevated or if there is ambiguity, I will have him return to clinic for recheck with nursing staff.  Plan to increase lisinopril-hydrochlorothiazide combo pill if it remains elevated to the same degree that it was today.         Relevant Medications    lisinopril-hydrochlorothiazide (ZESTORETIC) 20-12.5 MG tablet    Other Relevant Orders    Basic metabolic panel  (Ca, Cl, CO2, Creat, Gluc, K, Na, BUN)    Hyperlipidemia LDL goal <130     Check lipids.  Some evidence of carotid artery stenosis on past imaging.  Continue lovastatin.  Continue aspirin.         Relevant Medications    lovastatin (MEVACOR) 40 MG tablet    Other Relevant Orders    Lipid panel reflex to direct LDL Fasting    Hypertrophy of prostate with urinary obstruction     The patient describes worsening symptoms of obstruction.  He has had some screening for prostate cancer in the past with PSA testing although its unclear when the last time it was checked.  He does consume caffeine and alcohol which may be contributory.  Unclear clinical benefit of terazosin.  - Given worsening, I did recommend a PSA test.  We discussed the potential for false positives.  - Consider trial of tamsulosin.         Hypothyroidism     History of hypothyroidism.  Asymptomatic?  Blood pressure elevated.  Check TSH  "today.         Relevant Medications    levothyroxine (SYNTHROID/LEVOTHROID) 175 MCG tablet    Other Relevant Orders    TSH WITH FREE T4 REFLEX    Stenosis of right carotid artery     The patient is looking for follow-up on this.  Previous measurements have shown less than 50% stenosis.  No symptoms suggestive of presyncope/syncope related to carotid artery disease.  Continue statin.  Continue aspirin.  Evaluate with ultrasound.         Relevant Medications    aspirin (ASA) 325 MG tablet    Other Relevant Orders    Lipid panel reflex to direct LDL Fasting    US Carotid Bilateral   Other Visit Diagnoses     Lower urinary tract symptoms (LUTS)        Relevant Orders    PSA, screen    Screening for prostate cancer        Relevant Orders    PSA, screen          Patient has been advised of split billing requirements and indicates understanding: Yes      COUNSELING:  Reviewed preventive health counseling, as reflected in patient instructions       Regular exercise       Healthy diet/nutrition      BMI:   Estimated body mass index is 27.8 kg/m  as calculated from the following:    Height as of this encounter: 1.702 m (5' 7\").    Weight as of this encounter: 80.5 kg (177 lb 8 oz).   Weight management plan: Discussed healthy diet and exercise guidelines      He reports that he quit smoking about 54 years ago. His smoking use included cigarettes. He started smoking about 64 years ago. He has never used smokeless tobacco.    Appropriate preventive services were discussed with this patient, including applicable screening as appropriate for cardiovascular disease, diabetes, osteopenia/osteoporosis, and glaucoma.  As appropriate for age/gender, discussed screening for colorectal cancer, prostate cancer, breast cancer, and cervical cancer. Checklist reviewing preventive services available has been given to the patient.    Reviewed patients plan of care and provided an AVS. The Basic Care Plan (routine screening as documented in " Health Maintenance) for Taz meets the Care Plan requirement. This Care Plan has been established and reviewed with the Patient.    Tre Mckeon MD  [facility]    Identified Health Risks:    The patient was counseled and encouraged to consider modifying their diet and eating habits. He was provided with information on recommended healthy diet options.  The patient was provided with written information regarding signs of hearing loss.

## 2022-12-20 NOTE — ASSESSMENT & PLAN NOTE
The patient has had some ongoing pain of the right shoulder.  History of what sounds like osteoarthritis which was amenable to steroid injection.  Imaging not completed today.  Physical therapy suggested although the patient is undecided.

## 2022-12-20 NOTE — PATIENT INSTRUCTIONS
Patient Education   Personalized Prevention Plan  You are due for the preventive services outlined below.  Your care team is available to assist you in scheduling these services.  If you have already completed any of these items, please share that information with your care team to update in your medical record.  Health Maintenance Due   Topic Date Due     COVID-19 Vaccine (4 - Booster for Moderna series) 12/26/2021     Thyroid Function Lab  12/14/2022     Pneumococcal Vaccine (2 - PCV) 12/14/2022       Understanding USDA MyPlate  The USDA has guidelines to help you make healthy food choices. These are called MyPlate. MyPlate shows the food groups that make up healthy meals using the image of a place setting. Before you eat, think about the healthiest choices for what to put on your plate or in your cup or bowl. To learn more about building a healthy plate, visit www.choosemyplate.gov.    The food groups    Fruits. Any fruit or 100% fruit juice counts as part of the Fruit Group. Fruits may be fresh, canned, frozen, or dried, and may be whole, cut-up, or pureed. Make 1/2 of your plate fruits and vegetables.    Vegetables. Any vegetable or 100% vegetable juice counts as a member of the Vegetable Group. Vegetables may be fresh, frozen, canned, or dried. They can be served raw or cooked and may be whole, cut-up, or mashed. Make 1/2 of your plate fruits and vegetables.    Grains. All foods made from grains are part of the Grains Group. These include wheat, rice, oats, cornmeal, and barley. Grains are often used to make foods such as bread, pasta, oatmeal, cereal, tortillas, and grits. Grains should be no more than 1/4 of your plate. At least half of your grains should be whole grains.    Protein. This group includes meat, poultry, seafood, beans and peas, eggs, processed soy products (such as tofu), nuts (including nut butters), and seeds. Make protein choices no more than 1/4 of your plate. Meat and poultry choices  should be lean or low fat.    Dairy. The Dairy Group includes all fluid milk products and foods made from milk that contain calcium, such as yogurt and cheese. (Foods that have little calcium, such as cream, butter, and cream cheese, are not part of this group.) Most dairy choices should be low-fat or fat-free.    Oils. Oils aren't a food group, but they do contain essential nutrients. However it's important to watch your intake of oils. These are fats that are liquid at room temperature. They include canola, corn, olive, soybean, vegetable, and sunflower oil. Foods that are mainly oil include mayonnaise, certain salad dressings, and soft margarines. You likely already get your daily oil allowance from the foods you eat.  Things to limit  Eating healthy also means limiting these things in your diet:       Salt (sodium). Many processed foods have a lot of sodium. To keep sodium intake down, eat fresh vegetables, meats, poultry, and seafood when possible. Purchase low-sodium, reduced-sodium, or no-salt-added food products at the store. And don't add salt to your meals at home. Instead, season them with herbs and spices such as dill, oregano, cumin, and paprika. Or try adding flavor with lemon or lime zest and juice.    Saturated fat. Saturated fats are most often found in animal products such as beef, pork, and chicken. They are often solid at room temperature, such as butter. To reduce your saturated fat intake, choose leaner cuts of meat and poultry. And try healthier cooking methods such as grilling, broiling, roasting, or baking. For a simple lower-fat swap, use plain nonfat yogurt instead of mayonnaise when making potato salad or macaroni salad.    Added sugars. These are sugars added to foods. They are in foods such as ice cream, candy, soda, fruit drinks, sports drinks, energy drinks, cookies, pastries, jams, and syrups. Cut down on added sugars by sharing sweet treats with a family member or friend. You can  also choose fruit for dessert, and drink water or other unsweetened beverages.     [employer] last reviewed this educational content on 6/1/2020 2000-2021 [employer]. All rights reserved. This information is not intended as a substitute for professional medical care. Always follow your healthcare professional's instructions.          Signs of Hearing Loss      Hearing much better with one ear can be a sign of hearing loss.   Hearing loss is a problem shared by many people. In fact, it is one of the most common health problems, particularly as people age. Most people age 65 and older have some hearing loss. By age 80, almost everyone does. Hearing loss often occurs slowly over the years. So you may not realize your hearing has gotten worse.  Have your hearing checked  Call your healthcare provider if you:    Have to strain to hear normal conversation    Have to watch other people s faces very carefully to follow what they re saying    Need to ask people to repeat what they ve said    Often misunderstand what people are saying    Turn the volume of the television or radio up so high that others complain    Feel that people are mumbling when they re talking to you    Find that the effort to hear leaves you feeling tired and irritated    Notice, when using the phone, that you hear better with one ear than the other  [employer] last reviewed this educational content on 1/1/2020 2000-2021 [employer]. All rights reserved. This information is not intended as a substitute for professional medical care. Always follow your healthcare professional's instructions.

## 2022-12-20 NOTE — ASSESSMENT & PLAN NOTE
The patient describes worsening symptoms of obstruction.  He has had some screening for prostate cancer in the past with PSA testing although its unclear when the last time it was checked.  He does consume caffeine and alcohol which may be contributory.  Unclear clinical benefit of terazosin.  - Given worsening, I did recommend a PSA test.  We discussed the potential for false positives.  - Consider trial of tamsulosin.

## 2022-12-20 NOTE — ASSESSMENT & PLAN NOTE
Check lipids.  Some evidence of carotid artery stenosis on past imaging.  Continue lovastatin.  Continue aspirin.

## 2022-12-20 NOTE — ASSESSMENT & PLAN NOTE
This patient's blood pressure is unexpectedly elevated today.  It is checked from time to time outside of clinic and is generally been within normal limits.  I have asked him to check it every day for the next couple of weeks and report back.  If it remains elevated or if there is ambiguity, I will have him return to clinic for recheck with nursing staff.  Plan to increase lisinopril-hydrochlorothiazide combo pill if it remains elevated to the same degree that it was today.

## 2022-12-21 LAB
ANION GAP SERPL CALCULATED.3IONS-SCNC: 9 MMOL/L (ref 7–15)
BUN SERPL-MCNC: 28.3 MG/DL (ref 8–23)
CALCIUM SERPL-MCNC: 10.1 MG/DL (ref 8.8–10.2)
CHLORIDE SERPL-SCNC: 103 MMOL/L (ref 98–107)
CHOLEST SERPL-MCNC: 185 MG/DL
CREAT SERPL-MCNC: 1.15 MG/DL (ref 0.67–1.17)
DEPRECATED HCO3 PLAS-SCNC: 27 MMOL/L (ref 22–29)
GFR SERPL CREATININE-BSD FRML MDRD: 66 ML/MIN/1.73M2
GLUCOSE SERPL-MCNC: 97 MG/DL (ref 70–99)
HDLC SERPL-MCNC: 86 MG/DL
LDLC SERPL CALC-MCNC: 89 MG/DL
NONHDLC SERPL-MCNC: 99 MG/DL
POTASSIUM SERPL-SCNC: 4.8 MMOL/L (ref 3.4–5.3)
PSA SERPL-MCNC: 4.85 NG/ML (ref 0–6.5)
SODIUM SERPL-SCNC: 139 MMOL/L (ref 136–145)
TRIGL SERPL-MCNC: 50 MG/DL
TSH SERPL DL<=0.005 MIU/L-ACNC: 0.82 UIU/ML (ref 0.3–4.2)

## 2022-12-22 ENCOUNTER — HOSPITAL ENCOUNTER (OUTPATIENT)
Dept: ULTRASOUND IMAGING | Facility: HOSPITAL | Age: 77
Discharge: HOME | End: 2022-12-22
Attending: FAMILY MEDICINE | Admitting: FAMILY MEDICINE
Payer: COMMERCIAL

## 2022-12-22 DIAGNOSIS — I65.21 STENOSIS OF RIGHT CAROTID ARTERY: ICD-10-CM

## 2022-12-22 DIAGNOSIS — Z12.5 SCREENING FOR PROSTATE CANCER: ICD-10-CM

## 2022-12-22 DIAGNOSIS — R97.20 INCREASED PROSTATE SPECIFIC ANTIGEN (PSA) VELOCITY: Primary | ICD-10-CM

## 2022-12-22 PROCEDURE — 93880 EXTRACRANIAL BILAT STUDY: CPT

## 2023-01-10 ENCOUNTER — ALLIED HEALTH/NURSE VISIT (OUTPATIENT)
Dept: FAMILY MEDICINE | Facility: CLINIC | Age: 78
End: 2023-01-10
Attending: FAMILY MEDICINE
Payer: COMMERCIAL

## 2023-01-10 VITALS — HEART RATE: 50 BPM | SYSTOLIC BLOOD PRESSURE: 138 MMHG | DIASTOLIC BLOOD PRESSURE: 64 MMHG | OXYGEN SATURATION: 96 %

## 2023-01-10 DIAGNOSIS — I10 ESSENTIAL HYPERTENSION: Primary | ICD-10-CM

## 2023-01-10 PROCEDURE — 99207 PR NO CHARGE NURSE ONLY: CPT

## 2023-01-10 NOTE — PROGRESS NOTES
I met with Taz Bruce at the request of Dr. Mckeon to recheck his blood pressure.  Blood pressure medications on the med list were reviewed with patient.    Patient has taken all medications as per usual regimen: Yes  Patient reports tolerating them without any issues or concerns: Yes    Vitals:    01/10/23 1326 01/10/23 1336   BP: (!) 152/64 138/64   BP Location: Left arm Left arm   Patient Position: Sitting Sitting   Cuff Size: Adult Large Adult Large   Pulse: 50    SpO2: 96%        After 5 minutes, the patient's blood pressure was <140/90, the previous encounter was reviewed, recorded blood pressure below 140/90. Patient was discharged and the note will be sent to the provider for final review.    Balbir Clayton RN  [facility]

## 2023-01-11 ENCOUNTER — MYC MEDICAL ADVICE (OUTPATIENT)
Dept: FAMILY MEDICINE | Facility: CLINIC | Age: 78
End: 2023-01-11

## 2023-01-11 DIAGNOSIS — N40.1 HYPERTROPHY OF PROSTATE WITH URINARY OBSTRUCTION: ICD-10-CM

## 2023-01-11 DIAGNOSIS — N13.8 HYPERTROPHY OF PROSTATE WITH URINARY OBSTRUCTION: ICD-10-CM

## 2023-01-12 RX ORDER — TERAZOSIN 5 MG/1
5 CAPSULE ORAL AT BEDTIME
Qty: 90 CAPSULE | Refills: 3 | Status: SHIPPED | OUTPATIENT
Start: 2023-01-12 | End: 2023-04-02

## 2023-01-12 NOTE — TELEPHONE ENCOUNTER
"Routing refill request to provider for review/approval because:  Drug interaction warning  Early refill requested.    Last Written Prescription Date:  5/4/22  Last Fill Quantity: 90,  # refills: 2   Last office visit provider:  12/20/22     Requested Prescriptions   Pending Prescriptions Disp Refills     terazosin (HYTRIN) 5 MG capsule [Pharmacy Med Name: TERAZOSIN 5 MG CAPSULE] 90 capsule 2     Sig: TAKE 1 CAPSULE (5 MG) BY MOUTH AT BEDTIME       Alpha Blockers Failed - 1/12/2023 10:05 AM        Failed - Patient does not have Tadalafil, Vardenafil, or Sildenafil on their medication list        Passed - Blood pressure under 140/90 in past 12 months     BP Readings from Last 3 Encounters:   01/10/23 138/64   12/20/22 (!) 182/78   12/14/21 (!) 142/70                 Passed - Recent (12 mo) or future (30 days) visit within the authorizing provider's specialty     Patient has had an office visit with the authorizing provider or a provider within the authorizing providers department within the previous 12 mos or has a future within next 30 days. See \"Patient Info\" tab in inbasket, or \"Choose Columns\" in Meds & Orders section of the refill encounter.              Passed - Medication is active on med list        Passed - Patient is 18 years of age or older       Antiadrenergic Antihypertensives Passed - 1/12/2023 10:05 AM        Passed - Blood pressure less than 140/90 in past 6 months     BP Readings from Last 3 Encounters:   01/10/23 138/64   12/20/22 (!) 182/78   12/14/21 (!) 142/70                 Passed - Medication is active on med list        Passed - Patient is age 18 or older        Passed - Normal serum creatinine on file in past 12 months     Recent Labs   Lab Test 12/20/22  1135   CR 1.15       Ok to refill medication if creatinine is low          Passed - Recent (6 mo) or future (30 days) visit within the authorizing provider's specialty     Patient had office visit in the last 6 months or has a visit in the " "next 30 days with authorizing provider or within the authorizing provider's specialty.  See \"Patient Info\" tab in inbasket, or \"Choose Columns\" in Meds & Orders section of the refill encounter.                 Shola Rodriguez RN 01/12/23 10:06 AM  "

## 2023-03-02 ENCOUNTER — MYC MEDICAL ADVICE (OUTPATIENT)
Dept: FAMILY MEDICINE | Facility: CLINIC | Age: 78
End: 2023-03-02

## 2023-03-02 ENCOUNTER — THERAPY VISIT (OUTPATIENT)
Dept: PHYSICAL THERAPY | Facility: CLINIC | Age: 78
End: 2023-03-02
Attending: FAMILY MEDICINE
Payer: COMMERCIAL

## 2023-03-02 DIAGNOSIS — M25.511 CHRONIC RIGHT SHOULDER PAIN: ICD-10-CM

## 2023-03-02 DIAGNOSIS — M25.511 ACUTE PAIN OF RIGHT SHOULDER: Primary | ICD-10-CM

## 2023-03-02 DIAGNOSIS — G89.29 CHRONIC RIGHT SHOULDER PAIN: ICD-10-CM

## 2023-03-02 PROCEDURE — 97161 PT EVAL LOW COMPLEX 20 MIN: CPT | Mod: GP | Performed by: PHYSICAL THERAPIST

## 2023-03-02 PROCEDURE — 97110 THERAPEUTIC EXERCISES: CPT | Mod: GP | Performed by: PHYSICAL THERAPIST

## 2023-03-02 NOTE — PROGRESS NOTES
"Physical Therapy Initial Evaluation  Subjective:  The history is provided by the patient. No  was used.   Patient Health History  Taz Bruce being seen for Shoulder pain. Started in right arm. Lasted for 4-6 weeks. Than moved to right shoulder. Later it started in left shoulder. Some arm movements cause pain. Some don't. I play pickleball daily for 2 hours. Most arm movements don't cause pain.     Problem began: 11/1/2022.   Problem occurred: No specific event   Pain is reported as 2/10 and 3/10 on pain scale.  General health as reported by patient is excellent.  Pertinent medical history includes: high blood pressure, thyroid problems and weakness.   Red flags:  None as reported by patient.  Medical allergies: none.   Surgeries include:  None.    Current medications:  High blood pressure medication and thyroid medication.    Current occupation is Retired.                     Therapist Generated HPI Evaluation  Problem details: Pt reports with R shoulder pain consistent with impingement of supraspinatus tendon. Pt notes that around mid Nov, the pain started in his mid arm and was 7/10 pain at worst, then traveled up into his shoulder. He took a break from pickle ball for 6 weeks. His arm pain went away (it is now 2/10 pain at worst) but his shoulder pain continues to linger. He started up playing pickleball again, almost every day for 2-3 hours at a time, and it has not aggravated symptoms generally however he does notice pain or \"twinging\" with certain swings including overhead and joellen. His L shoulder has started to bother him now as well. He has not tried heat or ice but has taken Tylenol infrequently for the pain (3-4 times in the past few weeks).    Currently, pt can \"feel\" his shoulder but denies pain in sitting/rest. Pt notes that he had a cortisone shot back in 2000s which helped, but no issues since then. He does have pain in his hands/fingers that is worse in the morning and with " flexion. .         Type of problem:  Right shoulder.    This is a chronic condition.  Condition occurred with:  Unknown cause.  Where condition occurred: for unknown reasons.  Patient reports pain:  In the joint.  Pain quality: twinge with movement. and is intermittent.  Radiates to: previously radiated into upper arm. Pain is worse during the day (sometimes has to put arm in different position at night, side and back sleeper).  Since onset symptoms are gradually improving.  Symptoms are exacerbated by using arm behind back and certain positions  and relieved by rest and NSAID's.      Barriers include:  None as reported by patient.                        Objective:  System              Cervical/Thoracic Evaluation    AROM:  AROM Cervical:    Flexion:          WNL  Extension:       WNL  Rotation:         Left:     Right:  Side Bend:      Left: limited - pain     Right:  Limited - pain                               Shoulder Evaluation:  ROM:  AROM:    Flexion:  Left:  WNL    Right:  WNL  Extension: Left: WNLRight: WNL  Abduction:  Left: WNL   Right:  WNL + painful arch  Adduction:   Left:  WNL  Right:  WNL + pain   Internal Rotation:  Left:  T11-12 + mild pain    Right:  T11-12 + pain  External Rotation:  Left:  WNL    Right:  WNL    Horizontal Adduction:  Left:  WNL    Right:  WNL  Elbow Flexion:  Left:  WNL    Right:  WNL  Elbow Extension:  Left:  WNL   Right:  WNL        PROM:    Flexion:  Left:  WNL    Right: WNL    Extension:  Left:  WNL    Right:  WNL  Abduction:  Left:  WNL    Right:  WNL + painful arch around 70deg    Internal Rotation:  Left:  WNL    Right:  WNL  External Rotation:  Left:  WNL    Right:  WNL      Elbow Flexion:  Right:  WNL  Elbow Extension:  Right:  WNL  Flexion/External Rotation:  Left:  WNL    Right:  WNL + pain  Extension/Internal Rotation:  Left:  WNL    Right:  WNL - pain    Pain: mild pain unlocalized     Strength:    Flexion: Left:5/5   Pain:    Right: 5/5     Pain:   Extension:   Left: 5/5    Pain:    Right: 4/5    Pain:  Abduction:  Left: 5/5  Pain:    Right: 5/5   Strong/painful    Pain:  Adduction:  Left: 5/5    Pain:    Right: 5/5   Strong/painful    Pain:  Internal Rotation:  Left:5/5     Pain:    Right: 5/5   Strong/painful    Pain:  External Rotation:   Left:5/5     Pain:   Right:5/5     Pain:        Elbow Flexion:  Left:5/5     Pain:    Right:5/5     Pain:  Elbow Extension:  Left:5/5     Pain:    Right:5/5     Pain:  Stability Testing:        Right shoulder stability negative testing:  Apprehension  Special Tests:      Right shoulder positive for the following special tests:Impingement (+ empty can, + Neers, +Speeds)  Right shoulder negative for the following special tests:Rotator cuff tear and Acrimioclavicular  Palpation:  normal    Left shoulder tenderness not present at: Sternoclavicular; Acrimioclavicular or Biceps    Right shoulder tenderness not present at:Sternoclavicular; Acrimioclavicular or Biceps  Mobility Tests:  Mobility wnl shoulder: mild hypomobility GH inf glide.      Glenohumeral inferior right:  Hypomobile                                                 General Evaluation:                        Posture:      Sitting:  Rounded shoulders and forward head                                           ROS    Assessment/Plan:    Patient is a 78 year old male with right side shoulder complaints.    Patient has the following significant findings with corresponding treatment plan.                Diagnosis 1:  R shoulder pain  Pain -  hot/cold therapy, US, electric stimulation, manual therapy, self management, education and home program  Decreased ROM/flexibility - manual therapy, therapeutic exercise, therapeutic activity and home program  Decreased joint mobility - manual therapy, therapeutic exercise, therapeutic activity and home program  Decreased strength - therapeutic exercise, therapeutic activities and home program  Impaired muscle performance - biofeedback, electric  stimulation, neuro re-education and home program  Decreased function - therapeutic activities and home program  Impaired posture - neuro re-education, therapeutic activities and home program  Instability -  Therapeutic Activity  Therapeutic Exercise  Neuromuscular Re-education  Splinting/Taping/Bracing/Orthotic  home program    Therapy Evaluation Codes:   1) History comprised of:   Personal factors that impact the plan of care:      Age.    Comorbidity factors that impact the plan of care are:      High blood pressure and Weakness.     Medications impacting care: High blood pressure and thyroid medication.  2) Examination of Body Systems comprised of:   Body structures and functions that impact the plan of care:      Shoulder.   Activity limitations that impact the plan of care are:      Dressing, Sports and Sleeping.  3) Clinical presentation characteristics are:   Stable/Uncomplicated.  4) Decision-Making    Low complexity using standardized patient assessment instrument and/or measureable assessment of functional outcome.  Cumulative Therapy Evaluation is: Low complexity.    Previous and current functional limitations:  (See Goal Flow Sheet for this information)    Short term and Long term goals: (See Goal Flow Sheet for this information)     Communication ability:  Patient appears to be able to clearly communicate and understand verbal and written communication and follow directions correctly.  Treatment Explanation - The following has been discussed with the patient:   RX ordered/plan of care  Anticipated outcomes  Possible risks and side effects  This patient would benefit from PT intervention to resume normal activities.   Rehab potential is good.    Frequency:  1 X week, once daily  Duration:  for 9 weeks  Discharge Plan:  Achieve all LTG.  Independent in home treatment program.  Reach maximal therapeutic benefit.    Please refer to the daily flowsheet for treatment today, total treatment time and time spent  performing 1:1 timed codes.

## 2023-03-03 PROBLEM — M25.511 ACUTE PAIN OF RIGHT SHOULDER: Status: ACTIVE | Noted: 2022-12-20

## 2023-03-03 NOTE — PROGRESS NOTES
[facility]    OUTPATIENT Physical Therapy ORTHOPEDIC EVALUATION  PLAN OF TREATMENT FOR OUTPATIENT REHABILITATION  (COMPLETE FOR INITIAL CLAIMS ONLY)  Patient's Last Name, First Name, M.I.  YOB: 1945  Taz Bruce    Provider s Name:  [facility]   Medical Record No.  [id number]   Start of Care Date:  03/02/23   Onset Date:   11/01/22   Treatment Diagnosis:  Shoulder pain  Medical Diagnosis:     Chronic right shoulder pain  Acute pain of right shoulder       Goals:     03/02/23 0500   Body Part   Goals listed below are for R shoulder   Goal #1   Goal #1 reaching   Previous Functional Level No restrictions   Current Functional Level Cannot reach    Performance level high above shoulder without 3/10 pain   STG Target Performance overhead;Reach    Performance level with >3/10 pain   Rationale for dressing   Due date 03/23/23   LTG Target Performance overhead;Reach   Performance Level with no pain   Rationale for dressing   Due date 05/04/23         Therapy Frequency:  1x/wk  Predicted Duration of Therapy Intervention:  9 weeks    Zaida Walter, PT                 I CERTIFY THE NEED FOR THESE SERVICES FURNISHED UNDER        THIS PLAN OF TREATMENT AND WHILE UNDER MY CARE     (Physician attestation of this document indicates review and certification of the therapy plan).                     Certification Date From:  03/02/23   Certification Date To:  05/04/23    Referring Provider:  Tre Mckoen    Initial Assessment        See Epic Evaluation SOC Date: 03/02/23

## 2023-03-09 ENCOUNTER — MYC MEDICAL ADVICE (OUTPATIENT)
Dept: FAMILY MEDICINE | Facility: CLINIC | Age: 78
End: 2023-03-09

## 2023-03-09 ENCOUNTER — LAB (OUTPATIENT)
Dept: LAB | Facility: CLINIC | Age: 78
End: 2023-03-09
Payer: COMMERCIAL

## 2023-03-09 ENCOUNTER — THERAPY VISIT (OUTPATIENT)
Dept: PHYSICAL THERAPY | Facility: CLINIC | Age: 78
End: 2023-03-09
Attending: FAMILY MEDICINE
Payer: COMMERCIAL

## 2023-03-09 DIAGNOSIS — M25.511 ACUTE PAIN OF RIGHT SHOULDER: Primary | ICD-10-CM

## 2023-03-09 DIAGNOSIS — R97.20 INCREASED PROSTATE SPECIFIC ANTIGEN (PSA) VELOCITY: ICD-10-CM

## 2023-03-09 DIAGNOSIS — Z12.5 SCREENING FOR PROSTATE CANCER: ICD-10-CM

## 2023-03-09 LAB — PSA SERPL-MCNC: 5.56 NG/ML (ref 0–6.5)

## 2023-03-09 PROCEDURE — 97140 MANUAL THERAPY 1/> REGIONS: CPT | Mod: GP | Performed by: PHYSICAL THERAPIST

## 2023-03-09 PROCEDURE — 97110 THERAPEUTIC EXERCISES: CPT | Mod: GP | Performed by: PHYSICAL THERAPIST

## 2023-03-09 PROCEDURE — G0103 PSA SCREENING: HCPCS

## 2023-03-09 PROCEDURE — 36415 COLL VENOUS BLD VENIPUNCTURE: CPT

## 2023-03-10 DIAGNOSIS — R97.20 INCREASED PROSTATE SPECIFIC ANTIGEN (PSA) VELOCITY: Primary | ICD-10-CM

## 2023-03-13 NOTE — TELEPHONE ENCOUNTER
MEDICAL RECORDS REQUEST   [facility]  Urology Clinic  [address] [place] [address]  PHONE: [phone]  Fax: [phone]        FUTURE VISIT INFORMATION                                                   Taz Bruce, : 1945 scheduled for future visit at [facility]    APPOINTMENT INFORMATION:    Date: 2023    Provider:  Ashley Lowry MD    Reason for Visit/Diagnosis: elevated PSA    REFERRAL INFORMATION:    Referring provider:  Tre Mckeon MD in [facility]    RECORDS REQUESTED FOR VISIT                                                     NOTES  STATUS/DETAILS   OFFICE NOTE from referring provider  yes, 2022, 2021 -- Tre Mckeon MD in [facility]   MEDICATION LIST  YES   LABS     PSA  yes     PRE-VISIT CHECKLIST      Record collection complete Yes   Appointment appropriately scheduled           (right time/right provider) Yes   Joint diagnostic appointment coordinated correctly          (ensure right order & amount of time) Yes   MyChart activation Yes   Questionnaire complete If no, please explain PENDING

## 2023-03-16 ENCOUNTER — THERAPY VISIT (OUTPATIENT)
Dept: PHYSICAL THERAPY | Facility: CLINIC | Age: 78
End: 2023-03-16
Attending: FAMILY MEDICINE
Payer: COMMERCIAL

## 2023-03-16 DIAGNOSIS — M25.511 ACUTE PAIN OF RIGHT SHOULDER: Primary | ICD-10-CM

## 2023-03-16 PROCEDURE — 97140 MANUAL THERAPY 1/> REGIONS: CPT | Mod: GP | Performed by: PHYSICAL THERAPIST

## 2023-03-16 PROCEDURE — 97110 THERAPEUTIC EXERCISES: CPT | Mod: GP | Performed by: PHYSICAL THERAPIST

## 2023-03-27 ENCOUNTER — THERAPY VISIT (OUTPATIENT)
Dept: PHYSICAL THERAPY | Facility: CLINIC | Age: 78
End: 2023-03-27
Payer: COMMERCIAL

## 2023-03-27 DIAGNOSIS — M25.511 ACUTE PAIN OF RIGHT SHOULDER: Primary | ICD-10-CM

## 2023-03-27 PROCEDURE — 97110 THERAPEUTIC EXERCISES: CPT | Mod: GP | Performed by: PHYSICAL THERAPIST

## 2023-03-27 NOTE — PROGRESS NOTES
DISCHARGE REPORT    Progress reporting period is from 3/2/23 to 3/27/23.       SUBJECTIVE  Subjective: Notes that still a bit of soreness with weight out to the side. Has been able to return to pickleball 2-3 hours per day without increased symptoms. Constant aching is gone.    Current pain level is 0/10  .     Previous pain level was  3/10  .   Changes in function:  Yes (See Goal flowsheet attached for changes in current functional level)  Adverse reaction to treatment or activity: None    OBJECTIVE  Changes noted in objective findings:  Yes, Full AROM demonstrated. Mild impingement symptoms at end range abduction. Good ER/IR strength. R ER 4+/5, IR 5/5. L 5/5 ER, IR        ASSESSMENT/PLAN  Updated problem list and treatment plan: Diagnosis 1:  R shoulder pain  Decreased strength - home program  Impaired muscle performance - home program  STG/LTGs have been met or progress has been made towards goals:  Yes (See Goal flow sheet completed today.)  Assessment of Progress: The patient's condition is improving.  The patient has met all of their long term goals.  Self Management Plans:  Patient is independent in a home treatment program.  The family/caregiver has been instructed in home continuation of care.  I have re-evaluated this patient and find that the nature, scope, duration and intensity of the therapy is appropriate for the medical condition of the patient.  Taz continues to require the following intervention to meet STG and LTG's:  PT intervention is no longer required to meet STG/LTG.    Recommendations:  This patient is ready to be discharged from therapy and continue their home treatment program.    Please refer to the daily flowsheet for treatment today, total treatment time and time spent performing 1:1 timed codes.

## 2023-03-30 ENCOUNTER — OFFICE VISIT (OUTPATIENT)
Dept: ONCOLOGY | Facility: CLINIC | Age: 78
End: 2023-03-30
Attending: UROLOGY
Payer: COMMERCIAL

## 2023-03-30 ENCOUNTER — PRE VISIT (OUTPATIENT)
Dept: UROLOGY | Facility: CLINIC | Age: 78
End: 2023-03-30
Payer: COMMERCIAL

## 2023-03-30 VITALS
HEART RATE: 55 BPM | BODY MASS INDEX: 27.63 KG/M2 | WEIGHT: 176.4 LBS | TEMPERATURE: 97.2 F | OXYGEN SATURATION: 98 % | RESPIRATION RATE: 16 BRPM | SYSTOLIC BLOOD PRESSURE: 170 MMHG | DIASTOLIC BLOOD PRESSURE: 82 MMHG

## 2023-03-30 DIAGNOSIS — R97.20 INCREASED PROSTATE SPECIFIC ANTIGEN (PSA) VELOCITY: ICD-10-CM

## 2023-03-30 DIAGNOSIS — R39.9 LOWER URINARY TRACT SYMPTOMS (LUTS): Primary | ICD-10-CM

## 2023-03-30 PROCEDURE — 99204 OFFICE O/P NEW MOD 45 MIN: CPT | Performed by: UROLOGY

## 2023-03-30 PROCEDURE — G0463 HOSPITAL OUTPT CLINIC VISIT: HCPCS | Performed by: UROLOGY

## 2023-03-30 RX ORDER — TAMSULOSIN HYDROCHLORIDE 0.4 MG/1
0.4 CAPSULE ORAL DAILY
Qty: 30 CAPSULE | Refills: 11 | Status: SHIPPED | OUTPATIENT
Start: 2023-03-30 | End: 2023-06-12

## 2023-03-30 ASSESSMENT — PAIN SCALES - GENERAL: PAINLEVEL: NO PAIN (0)

## 2023-03-30 NOTE — LETTER
3/30/2023         RE: Taz Bruce  [address] [address]        Dear Colleague,    Thank you for referring your patient, Taz Bruce, to the [facility] [place]. Please see a copy of my visit note below.          Chief Complaint:   Elevated PSA   LUTS          Consult or Referral:     Mr. Taz Bruce is a 78 year old male seen in consultation from Dr. Mckeon.         History of Present Illness:    Taz Bruce is a very pleasant 78 year old male who presents with elevated PSA. He reports lower urinary symptoms including hesitancy and slow stream. He denies  family history of prostate cancer. He is very active for his age: He walks 5 miles a day and play pickle ball 2 hrs a day          Past Medical History:     Past Medical History:   Diagnosis Date     Arthritis 2005     HTN      Non-recurrent bilateral inguinal hernia without obstruction or gangrene 12/14/2021     Thyroid disease 2000?            Past Surgical History:     Past Surgical History:   Procedure Laterality Date     APPENDECTOMY       COLONOSCOPY  2000?     EYE SURGERY  2017?    Cataracts     HEMORRHOIDECTOMY  03/2011     ORTHOPEDIC SURGERY  2012? 1962    Broken elbow, left knee ligament repair     SURGICAL HISTORY OF -       left knee     TONSILLECTOMY              Medications     Current Outpatient Medications   Medication     acyclovir (ZOVIRAX) 5 % external cream     aspirin (ASA) 325 MG tablet     levothyroxine (SYNTHROID/LEVOTHROID) 175 MCG tablet     lisinopril-hydrochlorothiazide (ZESTORETIC) 20-12.5 MG tablet     lovastatin (MEVACOR) 40 MG tablet     tadalafil (CIALIS) 5 MG tablet     tamsulosin (FLOMAX) 0.4 MG capsule     terazosin (HYTRIN) 5 MG capsule     No current facility-administered medications for this visit.            Family History:     Family History   Problem Relation Age of Onset     Cerebrovascular Disease Brother      Cancer - colorectal Brother      Cerebrovascular  Disease Brother      Prostate Cancer Brother      Other Cancer Father         Lung-Smoking     Thyroid Disease Sister             Social History:     Social History     Socioeconomic History     Marital status:      Spouse name: Not on file     Number of children: Not on file     Years of education: Not on file     Highest education level: Not on file   Occupational History     Not on file   Tobacco Use     Smoking status: Former     Packs/day: 0.00     Years: 10.00     Pack years: 0.00     Types: Cigarettes     Start date: 1958     Quit date: 1968     Years since quittin.8     Smokeless tobacco: Never   Vaping Use     Vaping Use: Never used   Substance and Sexual Activity     Alcohol use: Yes     Comment: Casual     Drug use: No     Sexual activity: Yes     Partners: Female     Birth control/protection: None     Comment: ED   Other Topics Concern     Parent/sibling w/ CABG, MI or angioplasty before 65F 55M? No   Social History Narrative     Not on file     Social Determinants of Health     Financial Resource Strain: Not on file   Food Insecurity: Not on file   Transportation Needs: Not on file   Physical Activity: Not on file   Stress: Not on file   Social Connections: Not on file   Intimate Partner Violence: Not on file   Housing Stability: Not on file            Allergies:   Patient has no known allergies.         Review of Systems     10 point ROS of systems including Constitutional, Eyes, Respiratory, Cardiovascular, Gastroenterology, Genitourinary, Integumentary, Muscularskeletal, Psychiatric were all negative except for pertinent positives noted in my HPI.          Physical Exam:     Patient is a 78 year old  male Body mass index is 27.63 kg/m .,  Vitals: Blood pressure (!) 170/82, pulse 55, temperature 97.2  F (36.2  C), temperature source Tympanic, resp. rate 16, weight 80 kg (176 lb 6.4 oz), SpO2 98 %.  General Appearance Adult: Alert, no acute distress, oriented  HENT:  throat/mouth:normal, good dentition  Neck: No adenopathy,masses or thyromegaly  Lungs: no respiratory distress, or pursed lip breathing  Heart: No obvious jugular venous distension present  Abdomen: soft, nontender, no organomegaly or masses  Lymphatics: No cervical or supraclavicular adenopathy  Musculoskeltal: extremities normal, no peripheral edema  Skin: no suspicious lesions or rashes  Neuro: Alert, oriented, speech and mentation normal  Psych: affect and mood normal  Gait: Normal  : deferred      Labs and Pathology:    I reviewed all applicable laboratory and pathology data and went over findings with patient  Significant for mildly elevated PSA     Lab Results   Component Value Date/Time    PSA 5.56 03/09/2023 01:04 PM    PSA 4.85 12/20/2022 11:35 AM    PSA 3.32 10/14/2014 10:09 AM    PSA 2.48 11/07/2012 10:44 AM         Imaging:    No relevant imaging to review today          Assessment and Plan:     Assessment:  78 year old healthy male with mildly elevated PSA and LUTS     We had a long discussion about the utility of PSA screening.  We talked about the Pros and Cons. I told him that PSA cut off to consider work up including biopsy at his age is 10. We also discussed the emerging role of MRI in the diagnosis of prostate cancer and the potential for performing MRI-Ultrasound Fusion biopsy if suspicious lesions are noted. He would like to continue the PSA screening with his PCP and consider MRI when it gets closer to 10.     We also discussed flomax as a treatment option for his LUTS. Side effect profile was carefully reviewed.       Plan:  Start Flomax   Continue PSA screening through PCP   RTC as needed     45 total minutes spent on the date of the encounter including direct interaction with the patient, performing chart review, documentation and further activities as noted above.        Ashley Lowry MD   Department of Urology   [facility]         Again, thank you for allowing me to  participate in the care of your patient.        Sincerely,        Ashley Lowry MD

## 2023-03-30 NOTE — NURSING NOTE
"Oncology Rooming Note    March 30, 2023 8:35 AM   Taz Bruce is a 78 year old male who presents for:    Chief Complaint   Patient presents with     Oncology Clinic Visit     New Patient     Initial Vitals: BP (!) 170/82   Pulse 55   Temp 97.2  F (36.2  C) (Tympanic)   Resp 16   Wt 80 kg (176 lb 6.4 oz)   SpO2 98%   BMI 27.63 kg/m   Estimated body mass index is 27.63 kg/m  as calculated from the following:    Height as of 12/20/22: 1.702 m (5' 7\").    Weight as of this encounter: 80 kg (176 lb 6.4 oz). Body surface area is 1.94 meters squared.  No Pain (0) Comment: Data Unavailable   No LMP for male patient.  Allergies reviewed: Yes  Medications reviewed: Yes    Medications: Medication refills not needed today.  Pharmacy name entered into [employer]: [id number] [place] [id number]    Clinical concerns: f/u       Crys Rogel CMA              "

## 2023-03-30 NOTE — PROGRESS NOTES
Chief Complaint:   Elevated PSA   LUTS          Consult or Referral:     Mr. Taz Bruce is a 78 year old male seen in consultation from Dr. Mckeon.         History of Present Illness:    Taz Bruce is a very pleasant 78 year old male who presents with elevated PSA. He reports lower urinary symptoms including hesitancy and slow stream. He denies  family history of prostate cancer. He is very active for his age: He walks 5 miles a day and play pickle ball 2 hrs a day          Past Medical History:     Past Medical History:   Diagnosis Date     Arthritis 2005     HTN      Non-recurrent bilateral inguinal hernia without obstruction or gangrene 12/14/2021     Thyroid disease 2000?            Past Surgical History:     Past Surgical History:   Procedure Laterality Date     APPENDECTOMY       COLONOSCOPY  2000?     EYE SURGERY  2017?    Cataracts     HEMORRHOIDECTOMY  03/2011     ORTHOPEDIC SURGERY  2012? 1962    Broken elbow, left knee ligament repair     SURGICAL HISTORY OF -       left knee     TONSILLECTOMY              Medications     Current Outpatient Medications   Medication     acyclovir (ZOVIRAX) 5 % external cream     aspirin (ASA) 325 MG tablet     levothyroxine (SYNTHROID/LEVOTHROID) 175 MCG tablet     lisinopril-hydrochlorothiazide (ZESTORETIC) 20-12.5 MG tablet     lovastatin (MEVACOR) 40 MG tablet     tadalafil (CIALIS) 5 MG tablet     tamsulosin (FLOMAX) 0.4 MG capsule     terazosin (HYTRIN) 5 MG capsule     No current facility-administered medications for this visit.            Family History:     Family History   Problem Relation Age of Onset     Cerebrovascular Disease Brother      Cancer - colorectal Brother      Cerebrovascular Disease Brother      Prostate Cancer Brother      Other Cancer Father         Lung-Smoking     Thyroid Disease Sister             Social History:     Social History     Socioeconomic History     Marital status:      Spouse name: Not on file     Number of  children: Not on file     Years of education: Not on file     Highest education level: Not on file   Occupational History     Not on file   Tobacco Use     Smoking status: Former     Packs/day: 0.00     Years: 10.00     Pack years: 0.00     Types: Cigarettes     Start date: 1958     Quit date: 1968     Years since quittin.8     Smokeless tobacco: Never   Vaping Use     Vaping Use: Never used   Substance and Sexual Activity     Alcohol use: Yes     Comment: Casual     Drug use: No     Sexual activity: Yes     Partners: Female     Birth control/protection: None     Comment: ED   Other Topics Concern     Parent/sibling w/ CABG, MI or angioplasty before 65F 55M? No   Social History Narrative     Not on file     Social Determinants of Health     Financial Resource Strain: Not on file   Food Insecurity: Not on file   Transportation Needs: Not on file   Physical Activity: Not on file   Stress: Not on file   Social Connections: Not on file   Intimate Partner Violence: Not on file   Housing Stability: Not on file            Allergies:   Patient has no known allergies.         Review of Systems     10 point ROS of systems including Constitutional, Eyes, Respiratory, Cardiovascular, Gastroenterology, Genitourinary, Integumentary, Muscularskeletal, Psychiatric were all negative except for pertinent positives noted in my HPI.          Physical Exam:     Patient is a 78 year old  male Body mass index is 27.63 kg/m .,  Vitals: Blood pressure (!) 170/82, pulse 55, temperature 97.2  F (36.2  C), temperature source Tympanic, resp. rate 16, weight 80 kg (176 lb 6.4 oz), SpO2 98 %.  General Appearance Adult: Alert, no acute distress, oriented  HENT: throat/mouth:normal, good dentition  Neck: No adenopathy,masses or thyromegaly  Lungs: no respiratory distress, or pursed lip breathing  Heart: No obvious jugular venous distension present  Abdomen: soft, nontender, no organomegaly or masses  Lymphatics: No cervical or  supraclavicular adenopathy  Musculoskeltal: extremities normal, no peripheral edema  Skin: no suspicious lesions or rashes  Neuro: Alert, oriented, speech and mentation normal  Psych: affect and mood normal  Gait: Normal  : deferred      Labs and Pathology:    I reviewed all applicable laboratory and pathology data and went over findings with patient  Significant for mildly elevated PSA     Lab Results   Component Value Date/Time    PSA 5.56 03/09/2023 01:04 PM    PSA 4.85 12/20/2022 11:35 AM    PSA 3.32 10/14/2014 10:09 AM    PSA 2.48 11/07/2012 10:44 AM         Imaging:    No relevant imaging to review today          Assessment and Plan:     Assessment:  78 year old healthy male with mildly elevated PSA and LUTS     We had a long discussion about the utility of PSA screening.  We talked about the Pros and Cons. I told him that PSA cut off to consider work up including biopsy at his age is 10. We also discussed the emerging role of MRI in the diagnosis of prostate cancer and the potential for performing MRI-Ultrasound Fusion biopsy if suspicious lesions are noted. He would like to continue the PSA screening with his PCP and consider MRI when it gets closer to 10.     We also discussed flomax as a treatment option for his LUTS. Side effect profile was carefully reviewed.       Plan:  Start Flomax   Continue PSA screening through PCP   RTC as needed     45 total minutes spent on the date of the encounter including direct interaction with the patient, performing chart review, documentation and further activities as noted above.        Ashley Lowry MD   Department of Urology   [facility]

## 2023-04-21 DIAGNOSIS — R39.9 LOWER URINARY TRACT SYMPTOMS (LUTS): ICD-10-CM

## 2023-05-03 ENCOUNTER — ALLIED HEALTH/NURSE VISIT (OUTPATIENT)
Dept: FAMILY MEDICINE | Facility: CLINIC | Age: 78
End: 2023-05-03
Payer: COMMERCIAL

## 2023-05-03 VITALS — SYSTOLIC BLOOD PRESSURE: 132 MMHG | DIASTOLIC BLOOD PRESSURE: 65 MMHG | HEART RATE: 49 BPM

## 2023-05-03 DIAGNOSIS — I10 ESSENTIAL HYPERTENSION: Primary | ICD-10-CM

## 2023-05-03 PROCEDURE — 99207 PR NO CHARGE NURSE ONLY: CPT

## 2023-05-03 ASSESSMENT — PAIN SCALES - GENERAL: PAINLEVEL: NO PAIN (0)

## 2023-05-03 NOTE — PROGRESS NOTES
I met with Taz Bruce at the request of Dr Mckeon to recheck his blood pressure.  Blood pressure medications on the med list were reviewed with patient.    Patient has taken all medications as per usual regimen: Yes  Patient reports tolerating them without any issues or concerns: Yes    Vitals:    05/03/23 1114 05/03/23 1120   BP: (!) 146/67 132/65   BP Location: Left arm Left arm   Patient Position: Sitting Sitting   Cuff Size: Adult Regular Adult Regular   Pulse: (!) 48 (!) 49       After 5 minutes, the patient's blood pressure was <140/90, the previous encounter was reviewed, recorded blood pressure below 140/90. Patient was discharged and the note will be sent to the provider for final review.

## 2023-06-12 RX ORDER — TAMSULOSIN HYDROCHLORIDE 0.4 MG/1
0.8 CAPSULE ORAL DAILY
Qty: 60 CAPSULE | Refills: 11 | Status: SHIPPED | OUTPATIENT
Start: 2023-06-12 | End: 2023-06-12 | Stop reason: DRUGHIGH

## 2023-06-12 RX ORDER — TAMSULOSIN HYDROCHLORIDE 0.4 MG/1
0.8 CAPSULE ORAL DAILY
Qty: 60 CAPSULE | Refills: 11 | Status: SHIPPED | OUTPATIENT
Start: 2023-06-12 | End: 2023-09-08

## 2023-07-14 DIAGNOSIS — R39.9 LOWER URINARY TRACT SYMPTOMS (LUTS): ICD-10-CM

## 2023-09-08 RX ORDER — TADALAFIL 5 MG/1
5 TABLET ORAL EVERY 24 HOURS
Qty: 30 TABLET | Refills: 3 | Status: SHIPPED | OUTPATIENT
Start: 2023-09-08 | End: 2023-10-06

## 2023-09-08 RX ORDER — TAMSULOSIN HYDROCHLORIDE 0.4 MG/1
CAPSULE ORAL
Qty: 60 CAPSULE | Refills: 11 | Status: SHIPPED | OUTPATIENT
Start: 2023-09-08 | End: 2024-01-24

## 2023-09-18 ENCOUNTER — PATIENT OUTREACH (OUTPATIENT)
Dept: UROLOGY | Facility: CLINIC | Age: 78
End: 2023-09-18
Payer: COMMERCIAL

## 2023-09-18 NOTE — TELEPHONE ENCOUNTER
Pt reached out to writer stating that he has not been able to  his rx for Cialis.     Writer confirmed that per the order, it was received by his preferred pharmacy. Additionally, writer called pts pharmacy to confirm order. Per pts pharmacy, the medication is filled and ready for .     Writer reached out to pt to inform him that he can  his medication. Pt expressed understanding.     Writer will continue to follow as needed.

## 2023-09-21 ENCOUNTER — ALLIED HEALTH/NURSE VISIT (OUTPATIENT)
Dept: FAMILY MEDICINE | Facility: CLINIC | Age: 78
End: 2023-09-21
Payer: COMMERCIAL

## 2023-09-21 VITALS — DIASTOLIC BLOOD PRESSURE: 70 MMHG | OXYGEN SATURATION: 99 % | HEART RATE: 50 BPM | SYSTOLIC BLOOD PRESSURE: 187 MMHG

## 2023-09-21 DIAGNOSIS — N52.9 ERECTILE DYSFUNCTION, UNSPECIFIED ERECTILE DYSFUNCTION TYPE: Primary | ICD-10-CM

## 2023-09-21 DIAGNOSIS — I10 ESSENTIAL HYPERTENSION: Primary | ICD-10-CM

## 2023-09-21 PROCEDURE — 99207 PR NO CHARGE NURSE ONLY: CPT

## 2023-09-21 RX ORDER — TADALAFIL 5 MG/1
TABLET ORAL
Qty: 90 TABLET | Refills: 1 | Status: SHIPPED | OUTPATIENT
Start: 2023-09-21 | End: 2023-12-13

## 2023-09-21 NOTE — Clinical Note
TAYOI only- patient presented for self directed BP check for insurance reasons. Patient is going to take blood pressures at home for the next week and update clinic with readings as this high of a BP was unusual for him. He was asymptomatic at time of visit.

## 2023-09-21 NOTE — PROGRESS NOTES
I met with Taz Bruce at the request of self-directed blood pressure check. Blood pressure medications on the med list were reviewed with patient.    Patient has taken all medications as per usual regimen: Yes  Patient reports tolerating them without any issues or concerns: Yes    Vitals:    09/21/23 1000 09/21/23 1011   BP: (!) 170/83 (!) 187/70   BP Location: Left arm Left arm   Patient Position: Sitting Sitting   Cuff Size: Adult Large Adult Large   Pulse: 50    SpO2: 99%        After 5 minutes, the patient's blood pressure remained greater than or equal to 140/90.    Is the patient currently having any chest pain? No  Does the patient currently have a headache? No  Does the patient currently have any vision changes? No  Does the patient currently have any nausea? No  Does the patient currently have any abdominal pain? No    The previous encounter was reviewed.  The patient was discharged and the note will be sent to the provider for final review.  Patient was instructed to monitor blood pressures at home over the next week and send in readings to PCP. If readings remain elevated and patient experiences any above symptoms he would need to be evaluated. Patient verbalized understanding.

## 2023-10-06 DIAGNOSIS — R39.9 LOWER URINARY TRACT SYMPTOMS (LUTS): ICD-10-CM

## 2023-10-06 RX ORDER — TADALAFIL 5 MG/1
5 TABLET ORAL EVERY 24 HOURS
Qty: 30 TABLET | Refills: 3 | Status: SHIPPED | OUTPATIENT
Start: 2023-10-06 | End: 2024-01-24

## 2023-10-14 DIAGNOSIS — I10 ESSENTIAL HYPERTENSION: ICD-10-CM

## 2023-10-16 RX ORDER — LISINOPRIL AND HYDROCHLOROTHIAZIDE 12.5; 2 MG/1; MG/1
1 TABLET ORAL DAILY
Qty: 90 TABLET | Refills: 3 | Status: SHIPPED | OUTPATIENT
Start: 2023-10-16 | End: 2024-01-24

## 2023-10-25 ENCOUNTER — TELEPHONE (OUTPATIENT)
Dept: FAMILY MEDICINE | Facility: CLINIC | Age: 78
End: 2023-10-25
Payer: COMMERCIAL

## 2023-10-25 NOTE — TELEPHONE ENCOUNTER
Patient Quality Outreach    Patient is due for the following:   Hypertension -  BP check    Next Steps:   Patient has upcoming appointment, these items will be addressed at that time. Pt has F/U appt w/PCP on 12/22/23.    Type of outreach:    Chart review performed, no outreach needed.      Questions for provider review:    None           Ricardo Gómez Jr., MA

## 2023-12-13 DIAGNOSIS — N52.9 ERECTILE DYSFUNCTION, UNSPECIFIED ERECTILE DYSFUNCTION TYPE: ICD-10-CM

## 2023-12-13 RX ORDER — TADALAFIL 5 MG/1
TABLET ORAL
Qty: 90 TABLET | Refills: 1 | Status: SHIPPED | OUTPATIENT
Start: 2023-12-13 | End: 2024-01-24

## 2024-01-15 DIAGNOSIS — E78.5 HYPERLIPIDEMIA LDL GOAL <130: ICD-10-CM

## 2024-01-15 RX ORDER — LOVASTATIN 40 MG
40 TABLET ORAL AT BEDTIME
Qty: 90 TABLET | Refills: 3 | Status: SHIPPED | OUTPATIENT
Start: 2024-01-15

## 2024-01-18 DIAGNOSIS — E03.9 HYPOTHYROIDISM, UNSPECIFIED TYPE: ICD-10-CM

## 2024-01-18 NOTE — TELEPHONE ENCOUNTER
Medication Request    Medication name: levothyroxine (SYNTHROID/LEVOTHROID) 175 MCG tablet     Requested Pharmacy: [id number]    When was patient last seen by provider?:  12/20/22    Patient offered appointment:  N/A 01/24/24    Okay to leave a detailed message: no

## 2024-01-19 RX ORDER — LEVOTHYROXINE SODIUM 175 UG/1
175 TABLET ORAL DAILY
Qty: 90 TABLET | Refills: 3 | Status: SHIPPED | OUTPATIENT
Start: 2024-01-19

## 2024-01-21 ENCOUNTER — HEALTH MAINTENANCE LETTER (OUTPATIENT)
Age: 79
End: 2024-01-21

## 2024-01-24 ENCOUNTER — OFFICE VISIT (OUTPATIENT)
Dept: FAMILY MEDICINE | Facility: CLINIC | Age: 79
End: 2024-01-24
Payer: COMMERCIAL

## 2024-01-24 VITALS
HEART RATE: 53 BPM | BODY MASS INDEX: 27.28 KG/M2 | HEIGHT: 67 IN | RESPIRATION RATE: 16 BRPM | TEMPERATURE: 97.8 F | DIASTOLIC BLOOD PRESSURE: 70 MMHG | OXYGEN SATURATION: 96 % | SYSTOLIC BLOOD PRESSURE: 186 MMHG | WEIGHT: 173.8 LBS

## 2024-01-24 DIAGNOSIS — N40.1 HYPERTROPHY OF PROSTATE WITH URINARY OBSTRUCTION: ICD-10-CM

## 2024-01-24 DIAGNOSIS — I65.21 STENOSIS OF RIGHT CAROTID ARTERY: ICD-10-CM

## 2024-01-24 DIAGNOSIS — I10 ESSENTIAL HYPERTENSION: Primary | ICD-10-CM

## 2024-01-24 DIAGNOSIS — Z12.5 SCREENING FOR PROSTATE CANCER: ICD-10-CM

## 2024-01-24 DIAGNOSIS — K40.21 BILATERAL RECURRENT INGUINAL HERNIA WITHOUT OBSTRUCTION OR GANGRENE: ICD-10-CM

## 2024-01-24 DIAGNOSIS — N52.9 ERECTILE DYSFUNCTION, UNSPECIFIED ERECTILE DYSFUNCTION TYPE: ICD-10-CM

## 2024-01-24 DIAGNOSIS — E03.9 HYPOTHYROIDISM, UNSPECIFIED TYPE: ICD-10-CM

## 2024-01-24 DIAGNOSIS — R39.9 LOWER URINARY TRACT SYMPTOMS (LUTS): ICD-10-CM

## 2024-01-24 DIAGNOSIS — E78.5 HYPERLIPIDEMIA LDL GOAL <130: ICD-10-CM

## 2024-01-24 DIAGNOSIS — N13.8 HYPERTROPHY OF PROSTATE WITH URINARY OBSTRUCTION: ICD-10-CM

## 2024-01-24 LAB
ANION GAP SERPL CALCULATED.3IONS-SCNC: 10 MMOL/L (ref 7–15)
BUN SERPL-MCNC: 27 MG/DL (ref 8–23)
CALCIUM SERPL-MCNC: 9.3 MG/DL (ref 8.8–10.2)
CHLORIDE SERPL-SCNC: 104 MMOL/L (ref 98–107)
CHOLEST SERPL-MCNC: 204 MG/DL
CREAT SERPL-MCNC: 1.3 MG/DL (ref 0.67–1.17)
DEPRECATED HCO3 PLAS-SCNC: 26 MMOL/L (ref 22–29)
EGFRCR SERPLBLD CKD-EPI 2021: 56 ML/MIN/1.73M2
FASTING STATUS PATIENT QL REPORTED: YES
GLUCOSE SERPL-MCNC: 99 MG/DL (ref 70–99)
HDLC SERPL-MCNC: 67 MG/DL
LDLC SERPL CALC-MCNC: 124 MG/DL
NONHDLC SERPL-MCNC: 137 MG/DL
POTASSIUM SERPL-SCNC: 4.1 MMOL/L (ref 3.4–5.3)
PSA SERPL DL<=0.01 NG/ML-MCNC: 4.36 NG/ML (ref 0–6.5)
SODIUM SERPL-SCNC: 140 MMOL/L (ref 135–145)
TRIGL SERPL-MCNC: 65 MG/DL
TSH SERPL DL<=0.005 MIU/L-ACNC: 2.35 UIU/ML (ref 0.3–4.2)

## 2024-01-24 PROCEDURE — 80061 LIPID PANEL: CPT | Performed by: FAMILY MEDICINE

## 2024-01-24 PROCEDURE — 84443 ASSAY THYROID STIM HORMONE: CPT | Performed by: FAMILY MEDICINE

## 2024-01-24 PROCEDURE — 99215 OFFICE O/P EST HI 40 MIN: CPT | Performed by: FAMILY MEDICINE

## 2024-01-24 PROCEDURE — G0103 PSA SCREENING: HCPCS | Performed by: FAMILY MEDICINE

## 2024-01-24 PROCEDURE — 36415 COLL VENOUS BLD VENIPUNCTURE: CPT | Performed by: FAMILY MEDICINE

## 2024-01-24 PROCEDURE — 80048 BASIC METABOLIC PNL TOTAL CA: CPT | Performed by: FAMILY MEDICINE

## 2024-01-24 RX ORDER — HYDROCHLOROTHIAZIDE 12.5 MG/1
12.5 TABLET ORAL DAILY
Qty: 90 TABLET | Refills: 1 | Status: SHIPPED | OUTPATIENT
Start: 2024-01-24 | End: 2024-03-04

## 2024-01-24 RX ORDER — LISINOPRIL 40 MG/1
40 TABLET ORAL DAILY
Qty: 90 TABLET | Refills: 1 | Status: SHIPPED | OUTPATIENT
Start: 2024-01-24 | End: 2024-06-25

## 2024-01-24 RX ORDER — TAMSULOSIN HYDROCHLORIDE 0.4 MG/1
0.8 CAPSULE ORAL DAILY
Qty: 180 CAPSULE | Refills: 3 | Status: SHIPPED | OUTPATIENT
Start: 2024-01-24

## 2024-01-24 NOTE — PROGRESS NOTES
Assessment & Plan   Problem List Items Addressed This Visit       Bilateral recurrent inguinal hernia without obstruction or gangrene     He has not been symptomatic.  Discussed potential for incarceration.  Continue monitoring.         Erectile dysfunction, unspecified erectile dysfunction type    Essential hypertension - Primary     Blood pressure elevated today.  Many of his recent measurements have been borderline.  - Increase lisinopril.  New dose will be 40 mg.  - Continue hydrochlorothiazide at 12.5 mg.  - Recheck with nursing staff in a couple of weeks.  I anticipate the need to increase his therapy further.  - Asymptomatic.         Relevant Medications    lisinopril (ZESTRIL) 40 MG tablet    hydrochlorothiazide (HYDRODIURIL) 12.5 MG tablet    Other Relevant Orders    Basic metabolic panel  (Ca, Cl, CO2, Creat, Gluc, K, Na, BUN)    Hyperlipidemia LDL goal <130     Check lipids continue lovastatin.         Hypertrophy of prostate with urinary obstruction     He saw urology back in March.  He has been on tamsulosin.  It also sounds like he misunderstood instructions regarding tadalafil and that his dose was not the normal 5 mg/day dose.  I believe he has been taking 10 or even 15 mg/day without any particular benefit.  - Continue tamsulosin.  - Tadalafil?  For now we will completely discontinue.  If he notices a decline in urine flow quality he might need to add back in 5 mg/day.  He will not be taking this for erectile dysfunction.  - Consider PSA at his next visit.         Relevant Medications    tamsulosin (FLOMAX) 0.4 MG capsule    Hypothyroidism     Given low pulse, blood pressure unlikely to be the result of hyperthyroidism.  Check TSH.  Anticipate continuation of current dose of levothyroxine.         Relevant Orders    TSH with free T4 reflex    Stenosis of right carotid artery    Relevant Orders    Lipid panel reflex to direct LDL Fasting     Other Visit Diagnoses       Lower urinary tract symptoms  "(LUTS)        Relevant Medications    tamsulosin (FLOMAX) 0.4 MG capsule    Screening for prostate cancer        Relevant Orders    PSA, screen          42 minutes spent by me on the date of the encounter doing chart review, history and exam, documentation and further activities per the note     BMI  Estimated body mass index is 27.22 kg/m  as calculated from the following:    Height as of this encounter: 1.702 m (5' 7\").    Weight as of this encounter: 78.8 kg (173 lb 12.8 oz).   Weight management plan: Discussed healthy diet and exercise guidelines      Maru Mcghee is a 79 year old, presenting for the following health issues:  Hypertension (Follow-up/) and Musculoskeletal Problem (Discuss ongoing hand swell and finger lock)        1/24/2024     6:56 AM   Additional Questions   Roomed by meir     This patient presents with a number of concerns today.  He had thought that today's visit was going to be his annual physical.    Blood pressure: He is not particular surprised that it is elevated today.  He recently was at the dental office and had a blood pressure of 200/100.  No shortness of breath.  No chest pain.  He tries to remain physically active.    Urinary symptoms: He has questions about refills of medicines.  He has been taking tadalafil several times during the day as he did not know that the urologist recommendation superseded the prescription that I sent in earlier this year.    History of Present Illness       Hyperlipidemia:  He presents for follow up of hyperlipidemia.   He is taking medication to lower cholesterol. He is not having myalgia or other side effects to statin medications.    Hypertension: He presents for follow up of hypertension.  He does not check blood pressure  regularly outside of the clinic. Outside blood pressures have been over 140/90. He does not follow a low salt diet.     Reason for visit:  Annual checkup. Prostrate followup    He eats 0-1 servings of fruits and vegetables " "daily.He consumes 2 sweetened beverage(s) daily.He exercises with enough effort to increase his heart rate 60 or more minutes per day.  He exercises with enough effort to increase his heart rate 7 days per week.   He is taking medications regularly.         Objective    BP (!) 186/70 (BP Location: Left arm, Patient Position: Sitting, Cuff Size: Adult Regular)   Pulse 53   Temp 97.8  F (36.6  C) (Oral)   Resp 16   Ht 1.702 m (5' 7\")   Wt 78.8 kg (173 lb 12.8 oz)   SpO2 96%   BMI 27.22 kg/m    Body mass index is 27.22 kg/m .  Physical Exam  Nursing note reviewed.   Constitutional:       General: He is not in acute distress.     Appearance: Normal appearance. He is not ill-appearing.   HENT:      Head: Normocephalic and atraumatic.      Right Ear: External ear normal.      Left Ear: External ear normal.   Eyes:      General: No scleral icterus.     Extraocular Movements: Extraocular movements intact.      Conjunctiva/sclera: Conjunctivae normal.   Cardiovascular:      Rate and Rhythm: Normal rate and regular rhythm.      Heart sounds: Normal heart sounds. No murmur heard.     No friction rub. No gallop.   Pulmonary:      Effort: Pulmonary effort is normal. No respiratory distress.      Breath sounds: Normal breath sounds. No wheezing or rales.   Musculoskeletal:         General: No swelling. Normal range of motion.   Skin:     General: Skin is warm.      Coloration: Skin is not jaundiced.      Findings: No rash.   Neurological:      General: No focal deficit present.      Mental Status: He is alert and oriented to person, place, and time. Mental status is at baseline.   Psychiatric:         Attention and Perception: Attention normal.         Mood and Affect: Mood normal.         Speech: Speech normal.         Thought Content: Thought content normal.              Signed Electronically by: Tre Mckeon MD    "

## 2024-01-25 NOTE — ASSESSMENT & PLAN NOTE
He saw urology back in March.  He has been on tamsulosin.  It also sounds like he misunderstood instructions regarding tadalafil and that his dose was not the normal 5 mg/day dose.  I believe he has been taking 10 or even 15 mg/day without any particular benefit.  - Continue tamsulosin.  - Tadalafil?  For now we will completely discontinue.  If he notices a decline in urine flow quality he might need to add back in 5 mg/day.  He will not be taking this for erectile dysfunction.  - Consider PSA at his next visit.

## 2024-01-25 NOTE — ASSESSMENT & PLAN NOTE
Blood pressure elevated today.  Many of his recent measurements have been borderline.  - Increase lisinopril.  New dose will be 40 mg.  - Continue hydrochlorothiazide at 12.5 mg.  - Recheck with nursing staff in a couple of weeks.  I anticipate the need to increase his therapy further.  - Asymptomatic.

## 2024-01-25 NOTE — ASSESSMENT & PLAN NOTE
Given low pulse, blood pressure unlikely to be the result of hyperthyroidism.  Check TSH.  Anticipate continuation of current dose of levothyroxine.

## 2024-01-29 DIAGNOSIS — R94.4 DECREASED GFR: Primary | ICD-10-CM

## 2024-02-05 ENCOUNTER — LAB (OUTPATIENT)
Dept: LAB | Facility: CLINIC | Age: 79
End: 2024-02-05
Attending: FAMILY MEDICINE
Payer: COMMERCIAL

## 2024-02-05 ENCOUNTER — TELEPHONE (OUTPATIENT)
Dept: FAMILY MEDICINE | Facility: CLINIC | Age: 79
End: 2024-02-05

## 2024-02-05 ENCOUNTER — ALLIED HEALTH/NURSE VISIT (OUTPATIENT)
Dept: FAMILY MEDICINE | Facility: CLINIC | Age: 79
End: 2024-02-05
Payer: COMMERCIAL

## 2024-02-05 ENCOUNTER — MYC MEDICAL ADVICE (OUTPATIENT)
Dept: FAMILY MEDICINE | Facility: CLINIC | Age: 79
End: 2024-02-05

## 2024-02-05 VITALS — SYSTOLIC BLOOD PRESSURE: 216 MMHG | DIASTOLIC BLOOD PRESSURE: 84 MMHG | HEART RATE: 56 BPM

## 2024-02-05 DIAGNOSIS — R94.4 DECREASED GFR: ICD-10-CM

## 2024-02-05 DIAGNOSIS — Z01.30 BLOOD PRESSURE CHECK: Primary | ICD-10-CM

## 2024-02-05 LAB
ANION GAP SERPL CALCULATED.3IONS-SCNC: 8 MMOL/L (ref 7–15)
BUN SERPL-MCNC: 25.2 MG/DL (ref 8–23)
CALCIUM SERPL-MCNC: 9.4 MG/DL (ref 8.8–10.2)
CHLORIDE SERPL-SCNC: 104 MMOL/L (ref 98–107)
CREAT SERPL-MCNC: 1.17 MG/DL (ref 0.67–1.17)
DEPRECATED HCO3 PLAS-SCNC: 27 MMOL/L (ref 22–29)
EGFRCR SERPLBLD CKD-EPI 2021: 63 ML/MIN/1.73M2
GLUCOSE SERPL-MCNC: 108 MG/DL (ref 70–99)
POTASSIUM SERPL-SCNC: 4.3 MMOL/L (ref 3.4–5.3)
SODIUM SERPL-SCNC: 139 MMOL/L (ref 135–145)

## 2024-02-05 PROCEDURE — 36415 COLL VENOUS BLD VENIPUNCTURE: CPT

## 2024-02-05 PROCEDURE — 80048 BASIC METABOLIC PNL TOTAL CA: CPT

## 2024-02-05 PROCEDURE — 99207 PR NO CHARGE NURSE ONLY: CPT

## 2024-02-05 NOTE — TELEPHONE ENCOUNTER
Patient Quality Outreach    Patient is due for the following:   Physical Annual Wellness Visit    Next Steps:   Schedule a Annual Wellness Visit    Type of outreach:    Sent Doctor Fun message.      Questions for provider review:    None           Leah Alaniz MA  Chart routed to Care Team.

## 2024-02-05 NOTE — PROGRESS NOTES
Follow Up Blood Pressure Check    Taz Bruce is a 79 year old male recommended to follow up for blood pressure check by Tre Mckeon. Anihypertensive medications and adherence were verified: Yes.     Reason for visit: BP was high this morning at home     Medication change at last visit: Recent med change at the end of last month    Today's Vitals:   Vitals:    02/05/24 0856 02/05/24 0901   BP: (!) 209/85 (!) 216/84   BP Location: Left arm Left arm   Patient Position: Sitting Sitting   Cuff Size: Adult Regular Adult Regular   Pulse: 54 56       Home blood pressure readings brought in today:   High at home this morning too    Lowest blood pressure today is <180/<110 and they deny signs or symptoms.Pt states no side effects from the high blood pressure.     Thalia Aggarwal MA    Current Outpatient Medications   Medication Sig Dispense Refill    acyclovir (ZOVIRAX) 5 % external cream Apply topically 5 times daily 15 g 3    aspirin (ASA) 325 MG tablet Take 1 tablet (325 mg) by mouth daily 90 tablet 3    hydrochlorothiazide (HYDRODIURIL) 12.5 MG tablet Take 1 tablet (12.5 mg) by mouth daily 90 tablet 1    levothyroxine (SYNTHROID/LEVOTHROID) 175 MCG tablet Take 1 tablet (175 mcg) by mouth daily 90 tablet 3    lisinopril (ZESTRIL) 40 MG tablet Take 1 tablet (40 mg) by mouth daily 90 tablet 1    lovastatin (MEVACOR) 40 MG tablet TAKE 1 TABLET BY MOUTH AT BEDTIME 90 tablet 3    tamsulosin (FLOMAX) 0.4 MG capsule Take 2 capsules (0.8 mg) by mouth daily 180 capsule 3

## 2024-02-05 NOTE — PROGRESS NOTES
Call to patient re below.   He remains asymptomatic.   BP at time of call (took extra 12.5mg hydrochlorothiazide at 1pm)-  2:12pm- 165/75 P 56   2:19pm- 177/71    Patient reports he consistently walks 5mi/day for the last 4 years. He also drinks 5 cups of coffee a day and about 8oz of gatorade.     Encouraged patient to increase water intake. Follow-up BP check scheduled 2/14.     Please advise with further recommendations

## 2024-02-05 NOTE — LETTER
Taz MURRAY Isom     [address]     [place] [address]        6/14/2024    Dear Taz,     In reviewing your records, we have determined a gap in your preventive services. Based on your age and health history, we recommend the follow service.     General Physical      If you have had the service elsewhere, please contact us so we can update our records. Please let us know if you have transferred your care to another clinic.    Please call [phone] to schedule this appointment.    We believe that a strong preventive care program, including regular physicals and follow-up care is an important part of a healthy lifestyle and we are committed to helping you maintain your health.    Thank you for choosing us as your health care provider.    Sincerely,     [facility]

## 2024-02-05 NOTE — PROGRESS NOTES
Blood pressure in outside clinic was 216/84 this morning.    I saw him recently we increased his antihypertensive regimen.    Given his blood pressure measurement from this morning he needs triage.  Consider referral to emergency department if he has any symptoms.     - Continue lisinopril 40 mg.  - Increase hydrochlorothiazide to 25 mg (can double his 12.5 mg tablets).

## 2024-02-12 ENCOUNTER — MYC MEDICAL ADVICE (OUTPATIENT)
Dept: FAMILY MEDICINE | Facility: CLINIC | Age: 79
End: 2024-02-12
Payer: COMMERCIAL

## 2024-02-13 NOTE — TELEPHONE ENCOUNTER
Patient reported vitals added to flowsheet    2/14 visit is nurse only for BP check    See 2/5 nurse visit notes for details

## 2024-02-27 ENCOUNTER — MYC MEDICAL ADVICE (OUTPATIENT)
Dept: FAMILY MEDICINE | Facility: CLINIC | Age: 79
End: 2024-02-27
Payer: COMMERCIAL

## 2024-02-27 DIAGNOSIS — I10 ESSENTIAL HYPERTENSION: ICD-10-CM

## 2024-02-27 DIAGNOSIS — R03.0 ELEVATED BLOOD-PRESSURE READING, WITHOUT DIAGNOSIS OF HYPERTENSION: ICD-10-CM

## 2024-02-27 DIAGNOSIS — F41.9 ACUTE ANXIETY: Primary | ICD-10-CM

## 2024-03-01 RX ORDER — TRIAZOLAM 0.12 MG/1
.125-.25 TABLET ORAL
Qty: 2 TABLET | Refills: 0 | Status: SHIPPED | OUTPATIENT
Start: 2024-03-01 | End: 2024-08-27

## 2024-03-04 RX ORDER — HYDROCHLOROTHIAZIDE 25 MG/1
25 TABLET ORAL DAILY
Qty: 90 TABLET | Refills: 1 | Status: SHIPPED | OUTPATIENT
Start: 2024-03-04 | End: 2024-06-25

## 2024-03-15 NOTE — TELEPHONE ENCOUNTER
Patient had called regarding missed call from MA - states he does not want to schedule an annual visit despite not having one done - patient states his appointment that was for the end of January was supposed to be an annual but there was a miscommunication when scheduling, also stated that PCP did everything in that appointment that he would do in an annual.

## 2024-03-15 NOTE — TELEPHONE ENCOUNTER
2nd attempted  Patient Quality Outreach    Patient is due for the following:   Physical Annual Wellness Visit    Next Steps:   Schedule a Annual Wellness Visit    Type of outreach:    Phone, left message for patient/parent to call back.  Left message to call back for: pt  Information to relay to patient: Please inform pt that the visit on 1/24/24 was not a Annual Wellness physical check up. He is due for an Annual Wellness Visit with Dr. Avelar. Per chart review his last physical was done on 12/20/2022. Please help schedule appointment for pt when he call back. xl    Next Steps:  Reach out within 90 days via Letter.    Max number of attempts reached: No. Will try again in 90 days if patient still on fail list.    Questions for provider review:    None           Leah Alaniz MA  Chart routed to Care Team.

## 2024-05-07 ENCOUNTER — ALLIED HEALTH/NURSE VISIT (OUTPATIENT)
Dept: FAMILY MEDICINE | Facility: CLINIC | Age: 79
End: 2024-05-07
Payer: COMMERCIAL

## 2024-05-07 VITALS — HEART RATE: 54 BPM | SYSTOLIC BLOOD PRESSURE: 115 MMHG | DIASTOLIC BLOOD PRESSURE: 68 MMHG

## 2024-05-07 DIAGNOSIS — I10 ESSENTIAL HYPERTENSION: Primary | ICD-10-CM

## 2024-05-07 PROCEDURE — 99207 PR NO CHARGE NURSE ONLY: CPT

## 2024-05-07 NOTE — PROGRESS NOTES
I met with Taz Bruce at the request of Dr. Avelar to recheck his blood pressure.  Blood pressure medications on the med list were reviewed with patient.    Patient has taken all medications as per usual regimen: Yes  Patient reports tolerating them without any issues or concerns: Yes    Vitals:    05/07/24 1002 05/07/24 1010   BP: (!) 146/73 115/68   BP Location: Right arm Right arm   Patient Position: Sitting Sitting   Cuff Size: Adult Regular Adult Regular   Pulse: 56 54       After 5 minutes, the patient's blood pressure was <140/90, the previous encounter was reviewed, recorded blood pressure below 140/90. Patient was discharged and the note will be sent to the provider for final review.

## 2024-05-23 DIAGNOSIS — I10 ESSENTIAL HYPERTENSION: ICD-10-CM

## 2024-05-24 RX ORDER — LISINOPRIL 40 MG/1
40 TABLET ORAL DAILY
Qty: 90 TABLET | Refills: 1 | OUTPATIENT
Start: 2024-05-24

## 2024-06-07 ENCOUNTER — DOCUMENTATION ONLY (OUTPATIENT)
Dept: FAMILY MEDICINE | Facility: CLINIC | Age: 79
End: 2024-06-07
Payer: COMMERCIAL

## 2024-06-07 DIAGNOSIS — I10 ESSENTIAL HYPERTENSION: Primary | ICD-10-CM

## 2024-06-07 NOTE — PROGRESS NOTES
Taz Bruce has an upcoming lab appointment:    Future Appointments   Date Time Provider Department Center   6/17/2024  9:15 AM TS LAB [facility]     Patient is scheduled for the following lab(s): Patient is requesting a glucose and potassium per Dr. Mckeon.     There is no order available. Please review and place either future orders or HMPO (Review of Health Maintenance Protocol Orders), as appropriate.    Health Maintenance Due   Topic    ANNUAL REVIEW OF HM ORDERS      Danielle Barlow

## 2024-06-07 NOTE — PROGRESS NOTES
"From 2/12/24 [place] [employer] message-   \"When you were fasting, your glucose was within normal limits. We will recheck it as part of the blood test in a few months but that is not the intent of the recheck. The blood draw is to check a potassium level which can be affected by your blood pressure medicines.\"  "

## 2024-06-14 NOTE — TELEPHONE ENCOUNTER
3rd attempted    Patient Quality Outreach    Patient is due for the following:   Physical Annual Wellness Visit    Next Steps:   Schedule a Annual Wellness Visit    Type of outreach:    Sent letter.    Next Steps:  Reach out within 90 days via  See below .    Max number of attempts reached: Yes. Will try again in 90 days if patient still on fail list.    Questions for provider review:    None           Leah Alaniz MA

## 2024-06-17 ENCOUNTER — PATIENT OUTREACH (OUTPATIENT)
Dept: CARE COORDINATION | Facility: CLINIC | Age: 79
End: 2024-06-17

## 2024-06-17 ENCOUNTER — LAB (OUTPATIENT)
Dept: LAB | Facility: CLINIC | Age: 79
End: 2024-06-17
Payer: COMMERCIAL

## 2024-06-17 DIAGNOSIS — I10 ESSENTIAL HYPERTENSION: ICD-10-CM

## 2024-06-17 LAB
ANION GAP SERPL CALCULATED.3IONS-SCNC: 9 MMOL/L (ref 7–15)
BUN SERPL-MCNC: 30.7 MG/DL (ref 8–23)
CALCIUM SERPL-MCNC: 9.2 MG/DL (ref 8.8–10.2)
CHLORIDE SERPL-SCNC: 103 MMOL/L (ref 98–107)
CREAT SERPL-MCNC: 1.27 MG/DL (ref 0.67–1.17)
DEPRECATED HCO3 PLAS-SCNC: 26 MMOL/L (ref 22–29)
EGFRCR SERPLBLD CKD-EPI 2021: 57 ML/MIN/1.73M2
GLUCOSE SERPL-MCNC: 92 MG/DL (ref 70–99)
POTASSIUM SERPL-SCNC: 4.4 MMOL/L (ref 3.4–5.3)
SODIUM SERPL-SCNC: 138 MMOL/L (ref 135–145)

## 2024-06-17 PROCEDURE — 80048 BASIC METABOLIC PNL TOTAL CA: CPT

## 2024-06-17 PROCEDURE — 36415 COLL VENOUS BLD VENIPUNCTURE: CPT

## 2024-06-20 ENCOUNTER — MYC MEDICAL ADVICE (OUTPATIENT)
Dept: FAMILY MEDICINE | Facility: CLINIC | Age: 79
End: 2024-06-20
Payer: COMMERCIAL

## 2024-06-20 DIAGNOSIS — R79.89 ELEVATED SERUM CREATININE: Primary | ICD-10-CM

## 2024-06-24 DIAGNOSIS — I10 ESSENTIAL HYPERTENSION: ICD-10-CM

## 2024-06-25 RX ORDER — HYDROCHLOROTHIAZIDE 25 MG/1
25 TABLET ORAL DAILY
Qty: 90 TABLET | Refills: 3 | Status: SHIPPED | OUTPATIENT
Start: 2024-06-25

## 2024-06-25 RX ORDER — LISINOPRIL 40 MG/1
40 TABLET ORAL DAILY
Qty: 90 TABLET | Refills: 3 | Status: SHIPPED | OUTPATIENT
Start: 2024-06-25

## 2024-08-26 ENCOUNTER — LAB (OUTPATIENT)
Dept: LAB | Facility: CLINIC | Age: 79
End: 2024-08-26
Payer: COMMERCIAL

## 2024-08-26 ENCOUNTER — ALLIED HEALTH/NURSE VISIT (OUTPATIENT)
Dept: FAMILY MEDICINE | Facility: CLINIC | Age: 79
End: 2024-08-26
Payer: COMMERCIAL

## 2024-08-26 VITALS — SYSTOLIC BLOOD PRESSURE: 142 MMHG | HEART RATE: 55 BPM | DIASTOLIC BLOOD PRESSURE: 65 MMHG

## 2024-08-26 DIAGNOSIS — R79.89 ELEVATED SERUM CREATININE: ICD-10-CM

## 2024-08-26 DIAGNOSIS — I10 ESSENTIAL HYPERTENSION: ICD-10-CM

## 2024-08-26 DIAGNOSIS — E03.9 HYPOTHYROIDISM, UNSPECIFIED TYPE: ICD-10-CM

## 2024-08-26 DIAGNOSIS — Z01.30 BLOOD PRESSURE CHECK: Primary | ICD-10-CM

## 2024-08-26 LAB
ANION GAP SERPL CALCULATED.3IONS-SCNC: 9 MMOL/L (ref 7–15)
BUN SERPL-MCNC: 29.3 MG/DL (ref 8–23)
CALCIUM SERPL-MCNC: 9.2 MG/DL (ref 8.8–10.4)
CHLORIDE SERPL-SCNC: 104 MMOL/L (ref 98–107)
CREAT SERPL-MCNC: 1.29 MG/DL (ref 0.67–1.17)
EGFRCR SERPLBLD CKD-EPI 2021: 56 ML/MIN/1.73M2
GLUCOSE SERPL-MCNC: 95 MG/DL (ref 70–99)
HCO3 SERPL-SCNC: 27 MMOL/L (ref 22–29)
POTASSIUM SERPL-SCNC: 4.5 MMOL/L (ref 3.4–5.3)
SODIUM SERPL-SCNC: 140 MMOL/L (ref 135–145)

## 2024-08-26 PROCEDURE — 80048 BASIC METABOLIC PNL TOTAL CA: CPT

## 2024-08-26 PROCEDURE — 36415 COLL VENOUS BLD VENIPUNCTURE: CPT

## 2024-08-26 PROCEDURE — 99207 PR NO CHARGE NURSE ONLY: CPT

## 2024-08-26 PROCEDURE — 84443 ASSAY THYROID STIM HORMONE: CPT

## 2024-08-26 NOTE — PROGRESS NOTES
Follow Up Blood Pressure Check    Taz Bruce is a 79 year old male recommended to follow up for blood pressure check by Tre Mckeon. Anihypertensive medications and adherence were verified: Yes.     Reason for visit: Pt was here for a blood draw and wanted his blood pressure checked. Stated at home this morning it was around the 150's    Medication change at last visit: No    Today's Vitals:   Vitals:    08/26/24 1021   BP: (!) 142/65   BP Location: Right arm   Patient Position: Sitting   Cuff Size: Adult Regular   Pulse: 55       Home blood pressure readings brought in today:   Running around 150's-170's- eventually drops down to 140's     Lowest blood pressure today is less than 140/90 and they deny signs or symptoms     Thalia Aggarwal MA    Current Outpatient Medications   Medication Sig Dispense Refill    acyclovir (ZOVIRAX) 5 % external cream Apply topically 5 times daily 15 g 3    aspirin (ASA) 325 MG tablet Take 1 tablet (325 mg) by mouth daily 90 tablet 3    hydrochlorothiazide (HYDRODIURIL) 25 MG tablet TAKE 1 TABLET BY MOUTH EVERY DAY 90 tablet 3    levothyroxine (SYNTHROID/LEVOTHROID) 175 MCG tablet Take 1 tablet (175 mcg) by mouth daily 90 tablet 3    lisinopril (ZESTRIL) 40 MG tablet TAKE 1 TABLET BY MOUTH EVERY DAY 90 tablet 3    lovastatin (MEVACOR) 40 MG tablet TAKE 1 TABLET BY MOUTH AT BEDTIME 90 tablet 3    tamsulosin (FLOMAX) 0.4 MG capsule Take 2 capsules (0.8 mg) by mouth daily 180 capsule 3    triazolam (HALCION) 0.125 MG tablet Take 1-2 tablets (0.125-0.25 mg) by mouth nightly as needed for sleep ((Take 30-60 minutes before procedure.  You may take additional dose if struggling with anxiety.  Plan a ).) 2 tablet 0

## 2024-08-27 DIAGNOSIS — I10 ESSENTIAL HYPERTENSION: ICD-10-CM

## 2024-08-27 LAB — TSH SERPL DL<=0.005 MIU/L-ACNC: 0.73 UIU/ML (ref 0.3–4.2)

## 2024-08-27 RX ORDER — AMLODIPINE BESYLATE 2.5 MG/1
2.5 TABLET ORAL DAILY
Qty: 30 TABLET | Refills: 1 | Status: SHIPPED | OUTPATIENT
Start: 2024-08-27 | End: 2024-08-27

## 2024-08-27 RX ORDER — AMLODIPINE BESYLATE 2.5 MG/1
2.5 TABLET ORAL DAILY
Qty: 30 TABLET | Refills: 1 | Status: SHIPPED | OUTPATIENT
Start: 2024-08-27

## 2024-09-24 ENCOUNTER — ALLIED HEALTH/NURSE VISIT (OUTPATIENT)
Dept: FAMILY MEDICINE | Facility: CLINIC | Age: 79
End: 2024-09-24
Payer: COMMERCIAL

## 2024-09-24 VITALS — DIASTOLIC BLOOD PRESSURE: 70 MMHG | HEART RATE: 60 BPM | SYSTOLIC BLOOD PRESSURE: 133 MMHG

## 2024-09-24 DIAGNOSIS — Z01.30 BP CHECK: Primary | ICD-10-CM

## 2024-09-24 PROCEDURE — 99207 PR NO CHARGE NURSE ONLY: CPT

## 2024-09-24 NOTE — PROGRESS NOTES
Follow Up Blood Pressure Check    aTz Bruce is a 79 year old male recommended to follow up for blood pressure check by Tre Mckeon. Anihypertensive medications and adherence were verified: Yes.     Reason for visit: Elevated BP last visit 8/26/24  BP: (!) 142/65     Medication change at last visit: Added on amlodipine 2.5 mg daily    Today's Vitals:   Vitals:    09/24/24 1040 09/24/24 1049   BP: (!) 157/73 133/70   Pulse: 60 60     Lowest blood pressure today is less than 140/90 and they deny signs or symptoms of new onset: severe headache, fatigue, confusion, vision changes, chest pain, pounding in the chest, neck, ears, irregular heartbeat, difficulty breathing, and blood in the urine    Current Outpatient Medications   Medication Sig Dispense Refill    acyclovir (ZOVIRAX) 5 % external cream Apply topically 5 times daily 15 g 3    amLODIPine (NORVASC) 2.5 MG tablet Take 1 tablet (2.5 mg) by mouth daily. 30 tablet 1    aspirin (ASA) 325 MG tablet Take 1 tablet (325 mg) by mouth daily 90 tablet 3    hydrochlorothiazide (HYDRODIURIL) 25 MG tablet TAKE 1 TABLET BY MOUTH EVERY DAY 90 tablet 3    levothyroxine (SYNTHROID/LEVOTHROID) 175 MCG tablet Take 1 tablet (175 mcg) by mouth daily 90 tablet 3    lisinopril (ZESTRIL) 40 MG tablet TAKE 1 TABLET BY MOUTH EVERY DAY 90 tablet 3    lovastatin (MEVACOR) 40 MG tablet TAKE 1 TABLET BY MOUTH AT BEDTIME 90 tablet 3    tamsulosin (FLOMAX) 0.4 MG capsule Take 2 capsules (0.8 mg) by mouth daily 180 capsule 3

## 2024-10-10 ENCOUNTER — OFFICE VISIT (OUTPATIENT)
Dept: FAMILY MEDICINE | Facility: CLINIC | Age: 79
End: 2024-10-10
Payer: COMMERCIAL

## 2024-10-10 ENCOUNTER — ANCILLARY PROCEDURE (OUTPATIENT)
Dept: GENERAL RADIOLOGY | Facility: CLINIC | Age: 79
End: 2024-10-10
Attending: FAMILY MEDICINE
Payer: COMMERCIAL

## 2024-10-10 VITALS
RESPIRATION RATE: 16 BRPM | WEIGHT: 177 LBS | SYSTOLIC BLOOD PRESSURE: 149 MMHG | HEART RATE: 56 BPM | TEMPERATURE: 98.2 F | OXYGEN SATURATION: 97 % | HEIGHT: 67 IN | DIASTOLIC BLOOD PRESSURE: 79 MMHG | BODY MASS INDEX: 27.78 KG/M2

## 2024-10-10 DIAGNOSIS — M25.512 CHRONIC LEFT SHOULDER PAIN: ICD-10-CM

## 2024-10-10 DIAGNOSIS — G89.29 CHRONIC LEFT SHOULDER PAIN: ICD-10-CM

## 2024-10-10 DIAGNOSIS — I10 ESSENTIAL HYPERTENSION: ICD-10-CM

## 2024-10-10 DIAGNOSIS — M25.512 CHRONIC LEFT SHOULDER PAIN: Primary | ICD-10-CM

## 2024-10-10 DIAGNOSIS — G89.29 CHRONIC LEFT SHOULDER PAIN: Primary | ICD-10-CM

## 2024-10-10 PROCEDURE — 99214 OFFICE O/P EST MOD 30 MIN: CPT | Performed by: FAMILY MEDICINE

## 2024-10-10 PROCEDURE — G2211 COMPLEX E/M VISIT ADD ON: HCPCS | Performed by: FAMILY MEDICINE

## 2024-10-10 PROCEDURE — 73030 X-RAY EXAM OF SHOULDER: CPT | Mod: TC | Performed by: STUDENT IN AN ORGANIZED HEALTH CARE EDUCATION/TRAINING PROGRAM

## 2024-10-10 RX ORDER — AMLODIPINE BESYLATE 5 MG/1
5 TABLET ORAL DAILY
Qty: 90 TABLET | Refills: 1 | Status: SHIPPED | OUTPATIENT
Start: 2024-10-10

## 2024-10-10 NOTE — ASSESSMENT & PLAN NOTE
Blood pressure elevated today.  He feels normal/well on his current regimen.  - Continue hydrochlorothiazide 25 mg.  - Continue lisinopril 40 mg.  - Amlodipine.  Increase dose from 2.5 mg/day up to 5 mg/day.  - Recheck during his annual exam in December.

## 2024-10-10 NOTE — PROGRESS NOTES
"  Assessment & Plan   Problem List Items Addressed This Visit       Chronic left shoulder pain - Primary    Relevant Orders    XR Shoulder Left G/E 3 Views    Orthopedic  Referral    Essential hypertension     Blood pressure elevated today.  He feels normal/well on his current regimen.  - Continue hydrochlorothiazide 25 mg.  - Continue lisinopril 40 mg.  - Amlodipine.  Increase dose from 2.5 mg/day up to 5 mg/day.  - Recheck during his annual exam in December.         Relevant Medications    amLODIPine (NORVASC) 5 MG tablet      The longitudinal plan of care for the diagnosis(es)/condition(s) as documented were addressed during this visit. Due to the added complexity in care, I will continue to support Taz in the subsequent management and with ongoing continuity of care.      Subjective   Taz is a 79 year old, presenting for the following health issues:  Shoulder Pain (Left shoulder pain x a month)        10/10/2024    11:18 AM   Additional Questions   Roomed by meir     History of Present Illness       Reason for visit:  Left shoulder pain  Symptom onset:  3-4 weeks ago  Symptoms include:  Pain in.left shoulder when lifting arm up  Symptom intensity:  Moderate  Symptom progression:  Staying the same  Had these symptoms before:  No  What makes it worse:  When lifting arm  What makes it better:  Tylenol   He is taking medications regularly.         Objective    BP (!) 149/79 (BP Location: Left arm, Patient Position: Sitting, Cuff Size: Adult Regular)   Pulse 56   Temp 98.2  F (36.8  C) (Oral)   Resp 16   Ht 1.702 m (5' 7\")   Wt 80.3 kg (177 lb)   SpO2 97%   BMI 27.72 kg/m    Body mass index is 27.72 kg/m .  Physical Exam   Gen: nad   - MSK: Mildly positive Richter.  Mildly positive empty can testing.  Negative Neer's.  Normal active range of motion.  When I extend his shoulder up over his head it does tend to catch and move the scapula.  Nontender bony landmarks.    Left shoulder x-ray: " Osteophytes of the AC joint.  He also has a large spur extending into the glenohumeral fossa.  (My interpretation)          Signed Electronically by: Tre Mckeon MD

## 2024-11-04 ENCOUNTER — TELEPHONE (OUTPATIENT)
Dept: FAMILY MEDICINE | Facility: CLINIC | Age: 79
End: 2024-11-04
Payer: COMMERCIAL

## 2024-11-04 NOTE — TELEPHONE ENCOUNTER
Patient Quality Outreach    Patient is due for the following:   Hypertension -  BP check    Next Steps:   Patient has upcoming appointment, these items will be addressed at that time.  Appt x 12/12/24  Type of outreach:    Chart review performed, no outreach needed.      Questions for provider review:    None       Leah Alaniz MA

## 2024-12-08 ENCOUNTER — MYC MEDICAL ADVICE (OUTPATIENT)
Dept: FAMILY MEDICINE | Facility: CLINIC | Age: 79
End: 2024-12-08
Payer: COMMERCIAL

## 2024-12-12 ENCOUNTER — MYC MEDICAL ADVICE (OUTPATIENT)
Dept: FAMILY MEDICINE | Facility: CLINIC | Age: 79
End: 2024-12-12

## 2024-12-20 PROBLEM — E66.3 OVERWEIGHT WITH BODY MASS INDEX (BMI) OF 27 TO 27.9 IN ADULT: Status: ACTIVE | Noted: 2021-12-14

## 2025-01-07 ENCOUNTER — MYC MEDICAL ADVICE (OUTPATIENT)
Dept: FAMILY MEDICINE | Facility: CLINIC | Age: 80
End: 2025-01-07
Payer: COMMERCIAL

## 2025-01-18 DIAGNOSIS — I10 ESSENTIAL HYPERTENSION: ICD-10-CM

## 2025-01-20 RX ORDER — AMLODIPINE BESYLATE 5 MG/1
5 TABLET ORAL DAILY
Qty: 90 TABLET | Refills: 1 | OUTPATIENT
Start: 2025-01-20

## 2025-01-21 ENCOUNTER — MYC REFILL (OUTPATIENT)
Dept: FAMILY MEDICINE | Facility: CLINIC | Age: 80
End: 2025-01-21
Payer: COMMERCIAL

## 2025-01-21 DIAGNOSIS — R39.9 LOWER URINARY TRACT SYMPTOMS (LUTS): ICD-10-CM

## 2025-01-21 RX ORDER — TAMSULOSIN HYDROCHLORIDE 0.4 MG/1
0.8 CAPSULE ORAL DAILY
Qty: 180 CAPSULE | Refills: 2 | Status: SHIPPED | OUTPATIENT
Start: 2025-01-21

## 2025-01-21 RX ORDER — TAMSULOSIN HYDROCHLORIDE 0.4 MG/1
0.8 CAPSULE ORAL DAILY
Qty: 180 CAPSULE | Refills: 3 | OUTPATIENT
Start: 2025-01-21

## 2025-01-21 NOTE — TELEPHONE ENCOUNTER
This refill request is a duplicate request, previously received or sent.  Sent denial notification to pharmacy.  Guadalupe Moore RN  [facility]

## 2025-03-18 ENCOUNTER — MYC MEDICAL ADVICE (OUTPATIENT)
Dept: FAMILY MEDICINE | Facility: CLINIC | Age: 80
End: 2025-03-18
Payer: COMMERCIAL

## 2025-03-18 NOTE — TELEPHONE ENCOUNTER
Called Taz to follow up on message sent.  Taz did not want to be triaged but requested that Dr Mckeon be updated on the situation noted in message.  Taz stated that he has had these episodes periodically the last 10-15 years, but has never taken his blood pressure during an episode.  At this time Taz states he feels fine.    RN requested that Taz take his blood pressure over the next 3 days in the morning and evening and send a message with the readings attached on Friday morning.  Taz is agreeable to this plan.      OV 12/20/24 noted:  Essential hypertension  Blood pressure within target range.  No side effects from current regimen.  Plan to continue.  Reviewed symptoms of hypotension.  Check basic metabolic panel.

## 2025-04-10 ENCOUNTER — MYC MEDICAL ADVICE (OUTPATIENT)
Dept: FAMILY MEDICINE | Facility: CLINIC | Age: 80
End: 2025-04-10
Payer: COMMERCIAL

## 2025-04-10 DIAGNOSIS — N40.1 HYPERTROPHY OF PROSTATE WITH URINARY OBSTRUCTION: Primary | ICD-10-CM

## 2025-04-10 DIAGNOSIS — N13.8 HYPERTROPHY OF PROSTATE WITH URINARY OBSTRUCTION: Primary | ICD-10-CM

## 2025-04-16 DIAGNOSIS — I10 ESSENTIAL HYPERTENSION: ICD-10-CM

## 2025-04-16 RX ORDER — LISINOPRIL 40 MG/1
40 TABLET ORAL DAILY
Qty: 90 TABLET | Refills: 2 | Status: SHIPPED | OUTPATIENT
Start: 2025-04-16

## 2025-04-24 ENCOUNTER — VIRTUAL VISIT (OUTPATIENT)
Dept: UROLOGY | Facility: CLINIC | Age: 80
End: 2025-04-24
Attending: NURSE PRACTITIONER
Payer: COMMERCIAL

## 2025-04-24 VITALS — WEIGHT: 175 LBS | HEIGHT: 67 IN | BODY MASS INDEX: 27.47 KG/M2

## 2025-04-24 DIAGNOSIS — N40.1 HYPERTROPHY OF PROSTATE WITH URINARY OBSTRUCTION: ICD-10-CM

## 2025-04-24 DIAGNOSIS — N13.8 HYPERTROPHY OF PROSTATE WITH URINARY OBSTRUCTION: ICD-10-CM

## 2025-04-24 ASSESSMENT — PAIN SCALES - GENERAL: PAINLEVEL_OUTOF10: NO PAIN (0)

## 2025-04-24 NOTE — PATIENT INSTRUCTIONS
UROLOGY CLINIC VISIT PATIENT INSTRUCTIONS    It was a pleasure seeing you today! Thank you for giving us the opportunity to care for you. We hope we provided the excellent service you deserve and look forward to serving you again.    Instructions per today's visit: Will set up nurse visit and in person visit for further evaluation of lower urinary tract symptoms.  Will obtain flow rate and bladder scan to ensure bladder emptying.  Will obtain urine sample for further evaluation for concerns of possible UTI or blood in urine.    If you have any issues, questions, or concerns, please don't hesitate to contact us at [facility] at [phone] or via Puzzlium.    Important Contact Information:  -To each our nurse triage line, please call our contact center at [phone].  -Our clinic hours are Monday through Friday, 8:00 a.m. - 4:30 p.m. Feel free to call during these hours with any questions.  -You can also contact us anytime via Puzzlium, and we will respond during clinic hours.      Yamile Charles CNP  Department of Urology

## 2025-04-24 NOTE — PROGRESS NOTES
UROLOGY VIDEO OFFICE VISIT   Video-Visit Details    Type of service:  Video Visit    Video Start Time (time video started): 1034    Video End Time (time video stopped): 1053    Originating Location (pt. Location): Home    Distant Location (provider location):  Off-site    Mode of Communication:  Video Conference via Integrity Tracking    Video visit converted to telephonic visit as the video connection was lost and majority of visit was completed via audio-only.          Assessment and Plan:     Assessment: 80 year old male with BPH with LUTS.    Plan:  -Discussed the diagnosis and natural history of benign prostatic hyperplasia and lower urinary tract symptoms. We discussed treatment options including observation vs. medical therapy vs. surgical intervention.  He would like to avoid a surgical procedure at this time.  - Discussed option of adding in tadalafil (Cialis) 5 mg  daily for BPH with LUTS.  Discussed concern of OAB with BPH with LUTS.  Discussed option of starting on anticholinergic versus a beta 3 agonist.  Would recommend a beta 3 agonist due to potential side effects of the anticholinergic medication.  - Will set up a nurse visit for AUA score, flow rate, UA/UC, and PVR.  Discussed if no concerns of urinary retention can start on a beta 3 agonist.  Will initially start on Myrbetriq 25 mg daily.  - Return to clinic with in person visit for further evaluation.    Yamile Charles CNP  Department of Urology   April 24, 2025    I spent a total of 25 minutes spent on the date of the encounter doing chart review, history and exam, documentation, and further activities as noted above.          Chief Complaint:   BPH with LUTS         History of Present Illness:    Taz Bruce is a pleasant 80 year old male who presents with concerns of BPH with LUTS.    Mr. Bruce states most bothersome lower urinary tract symptoms are nocturia with intermittent weak stream.    He will need to void about every 2-3 hours and even  up to every hour at night.  He also notes a weak urinary stream at night.    Obstructive urinary symptoms include decreased force of stream, double voiding, feeling of incomplete bladder emptying, and prolonged micturition.  Irritative symptoms include occasional urinary urgency.    He will void about every 1-2 hours during the day depending on fluid intake.    Fluid intake consists of coffee in the morning and water in the afternoon.    Currently on tamsulosin 0.8mg daily and dutasteride 5mg daily.  He has been on the dutasteride for at least 3 months at this time.    Family history of prostate cancer in brother.    Denies any history of gross hematuria, UTIs, episodes of urinary retention, or history of prostatitis.         Past Medical History:     Past Medical History:   Diagnosis Date    Arthritis 2005    HTN     Non-recurrent bilateral inguinal hernia without obstruction or gangrene 12/14/2021    Thyroid disease 2000?            Past Surgical History:     Past Surgical History:   Procedure Laterality Date    APPENDECTOMY      COLONOSCOPY  2000?    EYE SURGERY  2017?    Cataracts    HEMORRHOIDECTOMY  03/2011    ORTHOPEDIC SURGERY  2012? 1962    Broken elbow, left knee ligament repair    SURGICAL HISTORY OF -       left knee    TONSILLECTOMY              Medications     Current Outpatient Medications   Medication Sig Dispense Refill    acyclovir (ZOVIRAX) 5 % external cream Apply topically 5 times daily 15 g 3    amLODIPine (NORVASC) 5 MG tablet TAKE 1 TABLET BY MOUTH EVERY DAY (Patient not taking: Reported on 4/24/2025) 90 tablet 1    aspirin (ASA) 325 MG tablet Take 1 tablet (325 mg) by mouth daily 90 tablet 3    dutasteride (AVODART) 0.5 MG capsule Take 1 capsule (0.5 mg) by mouth daily. 90 capsule 3    hydrochlorothiazide (HYDRODIURIL) 25 MG tablet TAKE 1 TABLET BY MOUTH EVERY DAY 90 tablet 3    levothyroxine (SYNTHROID/LEVOTHROID) 175 MCG tablet TAKE 1 TABLET BY MOUTH EVERY DAY 90 tablet 2    lisinopril  (ZESTRIL) 40 MG tablet TAKE 1 TABLET BY MOUTH EVERY DAY 90 tablet 2    lovastatin (MEVACOR) 40 MG tablet TAKE 1 TABLET BY MOUTH EVERYDAY AT BEDTIME 90 tablet 2    tamsulosin (FLOMAX) 0.4 MG capsule Take 2 capsules (0.8 mg) by mouth daily. 180 capsule 2     No current facility-administered medications for this visit.            Allergies:   Patient has no known allergies.         Review of Systems:  From intake questionnaire   Negative 14 system review except as noted on HPI, nurse's note.         Physical Exam:     General Appearance: Well groomed, hygenic  Eyes: No redness, discharge  Respiratory: No cough, no respiratory distress or labored breathing  Musculoskeletal: Grossly normal, full range of motion in upper extremities, no gross deficits  Skin: No discoloration or apparent rashes  Neurologic - No tremors  Psychiatric - Alert and oriented  The rest of a comprehensive physical examination is deferred due to video visit restrictions        Labs and Pathology:    I personally reviewed all applicable laboratory data and went over findings with patient  Significant for:    BMP RESULTS:  Recent Labs   Lab Test 12/20/24  1049 08/26/24  1014 06/17/24  0913 02/05/24  0849    140 138 139   POTASSIUM 4.3 4.5 4.4 4.3   CHLORIDE 102 104 103 104   CO2 25 27 26 27   ANIONGAP 11 9 9 8   * 95 92 108*   BUN 33.5* 29.3* 30.7* 25.2*   CR 1.38* 1.29* 1.27* 1.17   GFRESTIMATED 52* 56* 57* 63   EULOGIO 9.5 9.2 9.2 9.4     PSA RESULTS  PSA   Date Value Ref Range Status   10/14/2014 3.32 0 - 4 ug/L Final   11/07/2012 2.48 0 - 4 ug/L Final     Prostate Specific Antigen Screen   Date Value Ref Range Status   12/20/2024 6.34 0.00 - 6.50 ng/mL Final   01/24/2024 4.36 0.00 - 6.50 ng/mL Final   03/09/2023 5.56 0.00 - 6.50 ng/mL Final   12/20/2022 4.85 0.00 - 6.50 ng/mL Final

## 2025-04-24 NOTE — NURSING NOTE
Current patient location: [address] [place] [id number]    Is the patient currently in the state of [place]? YES    Visit mode: VIDEO    If the visit is dropped, the patient can be reconnected by:VIDEO VISIT: Send to e-mail at: [email or web address]    Will anyone else be joining the visit? NO  (If patient encounters technical issues they should call [phone]    Are changes needed to the allergy or medication list? No    Are refills needed on medications prescribed by this physician? NO    Rooming Documentation:  Questionnaire(s) completed    Reason for visit: Consult    Vandana SOTO

## 2025-04-28 ENCOUNTER — MYC MEDICAL ADVICE (OUTPATIENT)
Dept: FAMILY MEDICINE | Facility: CLINIC | Age: 80
End: 2025-04-28
Payer: COMMERCIAL

## 2025-04-28 DIAGNOSIS — N13.8 HYPERTROPHY OF PROSTATE WITH URINARY OBSTRUCTION: Primary | ICD-10-CM

## 2025-04-28 DIAGNOSIS — N40.1 HYPERTROPHY OF PROSTATE WITH URINARY OBSTRUCTION: Primary | ICD-10-CM

## 2025-04-30 RX ORDER — FINASTERIDE 5 MG/1
5 TABLET, FILM COATED ORAL DAILY
Qty: 90 TABLET | Refills: 1 | Status: SHIPPED | OUTPATIENT
Start: 2025-04-30

## 2025-05-21 ENCOUNTER — OFFICE VISIT (OUTPATIENT)
Dept: UROLOGY | Facility: CLINIC | Age: 80
End: 2025-05-21
Payer: COMMERCIAL

## 2025-05-21 VITALS — HEART RATE: 60 BPM | TEMPERATURE: 97.5 F | SYSTOLIC BLOOD PRESSURE: 138 MMHG | DIASTOLIC BLOOD PRESSURE: 66 MMHG

## 2025-05-21 DIAGNOSIS — R39.12 BENIGN PROSTATIC HYPERPLASIA WITH WEAK URINARY STREAM: Primary | ICD-10-CM

## 2025-05-21 DIAGNOSIS — N40.1 BENIGN PROSTATIC HYPERPLASIA WITH WEAK URINARY STREAM: Primary | ICD-10-CM

## 2025-05-21 LAB
ALBUMIN UR-MCNC: NEGATIVE MG/DL
APPEARANCE UR: CLEAR
BACTERIA #/AREA URNS HPF: ABNORMAL /HPF
BILIRUB UR QL STRIP: NEGATIVE
COLOR UR AUTO: YELLOW
GLUCOSE UR STRIP-MCNC: NEGATIVE MG/DL
HGB UR QL STRIP: NEGATIVE
HYALINE CASTS #/AREA URNS LPF: ABNORMAL /LPF
KETONES UR STRIP-MCNC: NEGATIVE MG/DL
LEUKOCYTE ESTERASE UR QL STRIP: NEGATIVE
NITRATE UR QL: NEGATIVE
PH UR STRIP: 6 [PH] (ref 5–8)
RBC #/AREA URNS AUTO: ABNORMAL /HPF
SP GR UR STRIP: 1.02 (ref 1–1.03)
SQUAMOUS #/AREA URNS AUTO: ABNORMAL /LPF
UROBILINOGEN UR STRIP-ACNC: 1 E.U./DL
WBC #/AREA URNS AUTO: ABNORMAL /HPF

## 2025-05-21 RX ORDER — TADALAFIL 5 MG/1
5 TABLET ORAL EVERY 24 HOURS
Qty: 90 TABLET | Refills: 0 | Status: SHIPPED | OUTPATIENT
Start: 2025-05-21

## 2025-05-21 ASSESSMENT — PAIN SCALES - GENERAL: PAINLEVEL_OUTOF10: NO PAIN (0)

## 2025-05-21 NOTE — PROGRESS NOTES
Patient here to see patient for return voiding issues. Shea Cantu RN    Patient educated regarding bladder scan procedure which writer performed.  Patient voiced understanding of information.  36 ml of urine remaining in bladder.  Shea Cantu RN

## 2025-05-21 NOTE — PROGRESS NOTES
UROLOGY OFFICE VISIT            Assessment and Plan:     Assessment: 80 year old male with BPH with LUTS.    Plan:  -Discussed with patient low PVR of 36 mL.  - Discussed option of continuing with tamsulosin and dutasteride only.  Discussed option of adding tadalafil (Cialis tadalafil (Cialis) to regimen.  He would like to try tadalafil at this time for BPH with LUTS. Discussed the need to alert EMS if chest pain or significant vision or hearing change or loss, erection lasting > 4h.  - Discussed an option of an outlet procedure.  Discussed TURP, Rezum, UroLift, HoLEP, and PAE procedure.  While he is interested, he would like to continue with medical therapy at this time.  -RTC in 3 months for reevaluation.     Yamile Charles CNP  Department of Urology   May 21, 2025    I spent a total of 30 minutes spent on the date of the encounter doing chart review, history and exam, documentation, and further activities as noted above.          Chief Complaint:   BPH with LUTS         History of Present Illness:   Taz Bruce is a pleasant 80 year old male who presents for follow up of BPH with LUTS.     Mr. Bruce was last seen with Urology on 4/24/25 with concerns of BPH with LUTS.  Most bothersome lower urinary tract symptoms are nocturia with intermittent weak stream.    PVR in clinic was 36 mL.    AUA score 17.    He notes that his symptoms have remained similar to when he was passing i on 4/25/2025.    His pharmacist did advise him to try finasteride due to cheaper cost.  They found it was not as cost effective and would prefer to stay on the dutasteride at this time.     He will need to void about every 2-3 hours and even up to every hour at night.  He also notes a weak urinary stream at night.     Obstructive urinary symptoms include decreased force of stream, double voiding, feeling of incomplete bladder emptying, and prolonged micturition.  Irritative symptoms include occasional urinary urgency.     He will void  about every 1-2 hours during the day depending on fluid intake.     Fluid intake consists of coffee in the morning and water in the afternoon.     Currently on tamsulosin 0.8mg daily and dutasteride 5mg daily.  He has been on the dutasteride for at least 3 months at this time.     Family history of prostate cancer in brother.     Denies any history of gross hematuria, UTIs, episodes of urinary retention, or history of prostatitis.         Past Medical History:     Past Medical History:   Diagnosis Date    Arthritis 2005    HTN     Non-recurrent bilateral inguinal hernia without obstruction or gangrene 12/14/2021    Thyroid disease 2000?            Past Surgical History:     Past Surgical History:   Procedure Laterality Date    APPENDECTOMY      COLONOSCOPY  2000?    EYE SURGERY  2017?    Cataracts    HEMORRHOIDECTOMY  03/2011    ORTHOPEDIC SURGERY  2012? 1962    Broken elbow, left knee ligament repair    SURGICAL HISTORY OF -       left knee    TONSILLECTOMY              Medications     Current Outpatient Medications   Medication Sig Dispense Refill    acyclovir (ZOVIRAX) 5 % external cream Apply topically 5 times daily 15 g 3    amLODIPine (NORVASC) 5 MG tablet TAKE 1 TABLET BY MOUTH EVERY DAY (Patient not taking: Reported on 4/24/2025) 90 tablet 1    aspirin (ASA) 325 MG tablet Take 1 tablet (325 mg) by mouth daily 90 tablet 3    dutasteride (AVODART) 0.5 MG capsule Take 1 capsule (0.5 mg) by mouth daily. 90 capsule 3    finasteride (PROSCAR) 5 MG tablet Take 1 tablet (5 mg) by mouth daily. 90 tablet 1    hydrochlorothiazide (HYDRODIURIL) 25 MG tablet TAKE 1 TABLET BY MOUTH EVERY DAY 90 tablet 3    levothyroxine (SYNTHROID/LEVOTHROID) 175 MCG tablet TAKE 1 TABLET BY MOUTH EVERY DAY 90 tablet 2    lisinopril (ZESTRIL) 40 MG tablet TAKE 1 TABLET BY MOUTH EVERY DAY 90 tablet 2    lovastatin (MEVACOR) 40 MG tablet TAKE 1 TABLET BY MOUTH EVERYDAY AT BEDTIME 90 tablet 2    tamsulosin (FLOMAX) 0.4 MG capsule Take 2  capsules (0.8 mg) by mouth daily. 180 capsule 2     No current facility-administered medications for this visit.            Allergies:   Patient has no known allergies.         Review of Systems:  From intake questionnaire   Negative 14 system review except as noted on HPI, nurse's note.         Physical Exam:     General Appearance: Well groomed, hygenic  Eyes: No redness, discharge  Respiratory: No cough, no respiratory distress or labored breathing  Musculoskeletal: Grossly normal, full range of motion in upper extremities, no gross deficits  Skin: No discoloration or apparent rashes  Neurologic - No tremors  Psychiatric - Alert and oriented        Labs and Pathology:    I personally reviewed all applicable laboratory data and went over findings with patient  Significant for:         BMP RESULTS:  Recent Labs   Lab Test 12/20/24  1049 08/26/24  1014 06/17/24  0913 02/05/24  0849    140 138 139   POTASSIUM 4.3 4.5 4.4 4.3   CHLORIDE 102 104 103 104   CO2 25 27 26 27   ANIONGAP 11 9 9 8   * 95 92 108*   BUN 33.5* 29.3* 30.7* 25.2*   CR 1.38* 1.29* 1.27* 1.17   GFRESTIMATED 52* 56* 57* 63   EULOGIO 9.5 9.2 9.2 9.4       UA RESULTS:     Component  Ref Range & Units (hover) 11:44 AM     Color Urine Yellow    Appearance Urine Clear    Glucose Urine Negative    Bilirubin Urine Negative    Ketones Urine Negative    Specific Gravity Urine 1.020    Blood Urine Negative    pH Urine 6.0    Protein Albumin Urine Negative    Urobilinogen Urine 1.0    Nitrite Urine Negative    Leukocyte Esterase Urine Negative            Component  Ref Range & Units (hover) 11:44 AM    Bacteria Urine Few Abnormal     RBC Urine 0-2    WBC Urine None Seen    Squamous Epithelials Urine Few Abnormal     Hyaline Casts Urine 5-10 Abnormal         PSA RESULTS  PSA   Date Value Ref Range Status   10/14/2014 3.32 0 - 4 ug/L Final   11/07/2012 2.48 0 - 4 ug/L Final     Prostate Specific Antigen Screen   Date Value Ref Range Status   12/20/2024  6.34 0.00 - 6.50 ng/mL Final   01/24/2024 4.36 0.00 - 6.50 ng/mL Final   03/09/2023 5.56 0.00 - 6.50 ng/mL Final   12/20/2022 4.85 0.00 - 6.50 ng/mL Final

## 2025-05-21 NOTE — PATIENT INSTRUCTIONS
UROLOGY CLINIC VISIT PATIENT INSTRUCTIONS    It was a pleasure seeing you today! Thank you for giving us the opportunity to care for you. We hope we provided the excellent service you deserve and look forward to serving you again.    Instructions per today's visit:     Website for medications:     [email or web address]     OR    [email or web address]. Need to make an account.     If you have any issues, questions, or concerns, please don't hesitate to contact us at [facility] at [phone] or via Prezma.    Important Contact Information:  -To each our nurse triage line, please call our contact center at [phone].  -Our clinic hours are Monday through Friday, 8:00 a.m. - 4:30 p.m. Feel free to call during these hours with any questions.  -You can also contact us anytime via Prezma, and we will respond during clinic hours.      Yamile Charles CNP  Department of Urology

## 2025-05-22 LAB — BACTERIA UR CULT: NO GROWTH

## 2025-05-27 ENCOUNTER — RESULTS FOLLOW-UP (OUTPATIENT)
Dept: UROLOGY | Facility: CLINIC | Age: 80
End: 2025-05-27

## 2025-06-23 ENCOUNTER — MYC MEDICAL ADVICE (OUTPATIENT)
Dept: FAMILY MEDICINE | Facility: CLINIC | Age: 80
End: 2025-06-23
Payer: COMMERCIAL

## 2025-06-30 ENCOUNTER — OFFICE VISIT (OUTPATIENT)
Dept: FAMILY MEDICINE | Facility: CLINIC | Age: 80
End: 2025-06-30
Payer: COMMERCIAL

## 2025-06-30 VITALS
BODY MASS INDEX: 27.09 KG/M2 | TEMPERATURE: 97.7 F | RESPIRATION RATE: 14 BRPM | HEIGHT: 67 IN | OXYGEN SATURATION: 97 % | WEIGHT: 172.6 LBS | SYSTOLIC BLOOD PRESSURE: 136 MMHG | DIASTOLIC BLOOD PRESSURE: 73 MMHG | HEART RATE: 55 BPM

## 2025-06-30 DIAGNOSIS — E03.9 HYPOTHYROIDISM, UNSPECIFIED TYPE: ICD-10-CM

## 2025-06-30 DIAGNOSIS — R42 LIGHTHEADEDNESS: ICD-10-CM

## 2025-06-30 DIAGNOSIS — I10 ESSENTIAL HYPERTENSION: Primary | ICD-10-CM

## 2025-06-30 LAB
ERYTHROCYTE [DISTWIDTH] IN BLOOD BY AUTOMATED COUNT: 12.9 % (ref 10–15)
HCT VFR BLD AUTO: 38.1 % (ref 40–53)
HGB BLD-MCNC: 12.7 G/DL (ref 13.3–17.7)
MCH RBC QN AUTO: 31.1 PG (ref 26.5–33)
MCHC RBC AUTO-ENTMCNC: 33.3 G/DL (ref 31.5–36.5)
MCV RBC AUTO: 93 FL (ref 78–100)
PLATELET # BLD AUTO: 249 10E3/UL (ref 150–450)
RBC # BLD AUTO: 4.09 10E6/UL (ref 4.4–5.9)
WBC # BLD AUTO: 4.7 10E3/UL (ref 4–11)

## 2025-06-30 PROCEDURE — 3078F DIAST BP <80 MM HG: CPT | Performed by: FAMILY MEDICINE

## 2025-06-30 PROCEDURE — 3075F SYST BP GE 130 - 139MM HG: CPT | Performed by: FAMILY MEDICINE

## 2025-06-30 PROCEDURE — 84460 ALANINE AMINO (ALT) (SGPT): CPT | Performed by: FAMILY MEDICINE

## 2025-06-30 PROCEDURE — 36415 COLL VENOUS BLD VENIPUNCTURE: CPT | Performed by: FAMILY MEDICINE

## 2025-06-30 PROCEDURE — 84443 ASSAY THYROID STIM HORMONE: CPT | Performed by: FAMILY MEDICINE

## 2025-06-30 PROCEDURE — 84439 ASSAY OF FREE THYROXINE: CPT | Performed by: FAMILY MEDICINE

## 2025-06-30 PROCEDURE — 80048 BASIC METABOLIC PNL TOTAL CA: CPT | Performed by: FAMILY MEDICINE

## 2025-06-30 PROCEDURE — 85027 COMPLETE CBC AUTOMATED: CPT | Performed by: FAMILY MEDICINE

## 2025-06-30 PROCEDURE — 99214 OFFICE O/P EST MOD 30 MIN: CPT | Performed by: FAMILY MEDICINE

## 2025-06-30 PROCEDURE — G2211 COMPLEX E/M VISIT ADD ON: HCPCS | Performed by: FAMILY MEDICINE

## 2025-06-30 RX ORDER — LISINOPRIL 20 MG/1
40 TABLET ORAL DAILY
Qty: 90 TABLET | Refills: 1 | Status: SHIPPED | OUTPATIENT
Start: 2025-06-30

## 2025-06-30 NOTE — ASSESSMENT & PLAN NOTE
The patient presents with concerns for symptoms of lightheadedness.  Intermittent.  Not necessarily exertional.  He wonders if his blood pressure is being too tightly measured as the symptoms are very similar to what he was experiencing before we discontinued and eliminated amlodipine.  His home blood pressure measurements vary but are generally consistent with today's measurement.  I think it is reasonable to decrease is antihypertensive medications.  - Continue hydrochlorothiazide 25 mg.  - Continue lisinopril but decrease the dose to 20 mg.    He will continue to monitor.  We discussed other potential causes.  His symptoms do not sound vertiginous in nature.  He has not fallen nor been at risk of falling.  We discussed potentially utilizing a cardiac monitor to evaluate for conditions such as atrial fibrillation and other arrhythmias.  He will send updates via the [employer] system.

## 2025-06-30 NOTE — PROGRESS NOTES
"  Assessment & Plan   Problem List Items Addressed This Visit       Essential hypertension - Primary    The patient presents with concerns for symptoms of lightheadedness.  Intermittent.  Not necessarily exertional.  He wonders if his blood pressure is being too tightly measured as the symptoms are very similar to what he was experiencing before we discontinued and eliminated amlodipine.  His home blood pressure measurements vary but are generally consistent with today's measurement.  I think it is reasonable to decrease is antihypertensive medications.  - Continue hydrochlorothiazide 25 mg.  - Continue lisinopril but decrease the dose to 20 mg.    He will continue to monitor.  We discussed other potential causes.  His symptoms do not sound vertiginous in nature.  He has not fallen nor been at risk of falling.  We discussed potentially utilizing a cardiac monitor to evaluate for conditions such as atrial fibrillation and other arrhythmias.  He will send updates via the [employer] system.         Relevant Medications    lisinopril (ZESTRIL) 20 MG tablet    Other Relevant Orders    Basic metabolic panel  (Ca, Cl, CO2, Creat, Gluc, K, Na, BUN)    ALT    Hypothyroidism    Relevant Orders    TSH with free T4 reflex     Other Visit Diagnoses         Lightheadedness        Relevant Orders    CBC with platelets           The longitudinal plan of care for the diagnosis(es)/condition(s) as documented were addressed during this visit. Due to the added complexity in care, I will continue to support Taz in the subsequent management and with ongoing continuity of care.    Maru Mcghee is a 80 year old, presenting for the following health issues:  Dizziness (intermittent)        6/30/2025    11:25 AM   Additional Questions   Roomed by ac   Accompanied by self     He has a sense of \"fainting\" every 1-2 days.  Dizziness better since going off amlodipine.  Home SBP .  \"Not highter.  DBP in 50s.   Symptoms not present " "while in fitness class.    No palpitations.     History of Present Illness       Reason for visit:  Occasionally have feeling of dizziness coming on   He is taking medications regularly.          Objective    /73 (BP Location: Left arm, Patient Position: Sitting, Cuff Size: Adult Regular)   Pulse 55   Temp 97.7  F (36.5  C) (Oral)   Resp 14   Ht 1.702 m (5' 7\")   Wt 78.3 kg (172 lb 9.6 oz)   SpO2 97%   BMI 27.03 kg/m    Body mass index is 27.03 kg/m .  Physical Exam  Nursing note reviewed.   Constitutional:       General: He is not in acute distress.     Appearance: Normal appearance. He is not ill-appearing.   HENT:      Head: Normocephalic and atraumatic.   Eyes:      Extraocular Movements: Extraocular movements intact.      Conjunctiva/sclera: Conjunctivae normal.   Cardiovascular:      Rate and Rhythm: Normal rate and regular rhythm.      Heart sounds: Normal heart sounds. No murmur heard.     No friction rub. No gallop.   Pulmonary:      Effort: Pulmonary effort is normal.   Neurological:      Mental Status: He is alert and oriented to person, place, and time.   Psychiatric:         Attention and Perception: Attention normal.         Mood and Affect: Mood normal.         Speech: Speech normal.         Thought Content: Thought content normal.                    Signed Electronically by: Tre Mckeon MD    "

## 2025-07-01 LAB
ALT SERPL W P-5'-P-CCNC: 14 U/L (ref 0–70)
ANION GAP SERPL CALCULATED.3IONS-SCNC: 10 MMOL/L (ref 7–15)
BUN SERPL-MCNC: 24.6 MG/DL (ref 8–23)
CALCIUM SERPL-MCNC: 9.3 MG/DL (ref 8.8–10.4)
CHLORIDE SERPL-SCNC: 104 MMOL/L (ref 98–107)
CREAT SERPL-MCNC: 1.24 MG/DL (ref 0.67–1.17)
EGFRCR SERPLBLD CKD-EPI 2021: 59 ML/MIN/1.73M2
GLUCOSE SERPL-MCNC: 95 MG/DL (ref 70–99)
HCO3 SERPL-SCNC: 25 MMOL/L (ref 22–29)
POTASSIUM SERPL-SCNC: 4.4 MMOL/L (ref 3.4–5.3)
SODIUM SERPL-SCNC: 139 MMOL/L (ref 135–145)
T4 FREE SERPL-MCNC: 2.19 NG/DL (ref 0.9–1.7)
TSH SERPL DL<=0.005 MIU/L-ACNC: 0.08 UIU/ML (ref 0.3–4.2)

## 2025-07-02 ENCOUNTER — MYC REFILL (OUTPATIENT)
Dept: FAMILY MEDICINE | Facility: CLINIC | Age: 80
End: 2025-07-02
Payer: COMMERCIAL

## 2025-07-02 ENCOUNTER — RESULTS FOLLOW-UP (OUTPATIENT)
Dept: FAMILY MEDICINE | Facility: CLINIC | Age: 80
End: 2025-07-02

## 2025-07-02 DIAGNOSIS — D50.9 MICROCYTIC ANEMIA: Primary | ICD-10-CM

## 2025-07-02 DIAGNOSIS — N40.1 HYPERTROPHY OF PROSTATE WITH URINARY OBSTRUCTION: ICD-10-CM

## 2025-07-02 DIAGNOSIS — N13.8 HYPERTROPHY OF PROSTATE WITH URINARY OBSTRUCTION: ICD-10-CM

## 2025-07-02 RX ORDER — LEVOTHYROXINE SODIUM 150 UG/1
150 TABLET ORAL DAILY
Qty: 90 TABLET | Refills: 2 | Status: SHIPPED | OUTPATIENT
Start: 2025-07-02

## 2025-07-03 ENCOUNTER — MYC MEDICAL ADVICE (OUTPATIENT)
Dept: FAMILY MEDICINE | Facility: CLINIC | Age: 80
End: 2025-07-03
Payer: COMMERCIAL

## 2025-07-03 DIAGNOSIS — N13.8 HYPERTROPHY OF PROSTATE WITH URINARY OBSTRUCTION: ICD-10-CM

## 2025-07-03 DIAGNOSIS — N40.1 HYPERTROPHY OF PROSTATE WITH URINARY OBSTRUCTION: ICD-10-CM

## 2025-07-03 RX ORDER — DUTASTERIDE 0.5 MG/1
0.5 CAPSULE, LIQUID FILLED ORAL DAILY
Qty: 90 CAPSULE | Refills: 2 | Status: SHIPPED | OUTPATIENT
Start: 2025-07-03 | End: 2025-07-03

## 2025-07-03 RX ORDER — DUTASTERIDE 0.5 MG/1
0.5 CAPSULE, LIQUID FILLED ORAL DAILY
Qty: 90 CAPSULE | Refills: 2 | Status: SHIPPED | OUTPATIENT
Start: 2025-07-03

## 2025-07-24 DIAGNOSIS — I10 ESSENTIAL HYPERTENSION: ICD-10-CM

## 2025-07-24 RX ORDER — HYDROCHLOROTHIAZIDE 25 MG/1
25 TABLET ORAL DAILY
Qty: 90 TABLET | Refills: 3 | Status: SHIPPED | OUTPATIENT
Start: 2025-07-24

## 2025-08-11 ENCOUNTER — VIRTUAL VISIT (OUTPATIENT)
Dept: UROLOGY | Facility: CLINIC | Age: 80
End: 2025-08-11
Payer: COMMERCIAL

## 2025-08-11 DIAGNOSIS — R39.12 BENIGN PROSTATIC HYPERPLASIA WITH WEAK URINARY STREAM: Primary | ICD-10-CM

## 2025-08-11 DIAGNOSIS — N40.1 BENIGN PROSTATIC HYPERPLASIA WITH WEAK URINARY STREAM: Primary | ICD-10-CM

## 2025-08-11 PROCEDURE — 98005 SYNCH AUDIO-VIDEO EST LOW 20: CPT | Performed by: NURSE PRACTITIONER

## 2025-08-11 PROCEDURE — 1126F AMNT PAIN NOTED NONE PRSNT: CPT | Mod: 95 | Performed by: NURSE PRACTITIONER

## 2025-08-11 ASSESSMENT — PAIN SCALES - GENERAL: PAINLEVEL_OUTOF10: NO PAIN (0)

## 2025-09-03 ENCOUNTER — LAB (OUTPATIENT)
Dept: LAB | Facility: CLINIC | Age: 80
End: 2025-09-03
Attending: FAMILY MEDICINE
Payer: COMMERCIAL

## 2025-09-03 DIAGNOSIS — E03.9 HYPOTHYROIDISM, UNSPECIFIED TYPE: ICD-10-CM

## 2025-09-03 DIAGNOSIS — D50.9 MICROCYTIC ANEMIA: ICD-10-CM

## 2025-09-03 LAB
ERYTHROCYTE [DISTWIDTH] IN BLOOD BY AUTOMATED COUNT: 13 % (ref 10–15)
HCT VFR BLD AUTO: 36.7 % (ref 40–53)
HGB BLD-MCNC: 12.3 G/DL (ref 13.3–17.7)
MCH RBC QN AUTO: 30.4 PG (ref 26.5–33)
MCHC RBC AUTO-ENTMCNC: 33.5 G/DL (ref 31.5–36.5)
MCV RBC AUTO: 90.6 FL (ref 78–100)
PLATELET # BLD AUTO: 250 10E3/UL (ref 150–450)
RBC # BLD AUTO: 4.05 10E6/UL (ref 4.4–5.9)
TSH SERPL DL<=0.005 MIU/L-ACNC: 0.39 UIU/ML (ref 0.3–4.2)
WBC # BLD AUTO: 5.64 10E3/UL (ref 4–11)

## 2025-09-03 PROCEDURE — 84443 ASSAY THYROID STIM HORMONE: CPT

## 2025-09-03 PROCEDURE — 36415 COLL VENOUS BLD VENIPUNCTURE: CPT

## 2025-09-03 PROCEDURE — 85027 COMPLETE CBC AUTOMATED: CPT
